# Patient Record
Sex: FEMALE | Race: WHITE | NOT HISPANIC OR LATINO | Employment: OTHER | URBAN - METROPOLITAN AREA
[De-identification: names, ages, dates, MRNs, and addresses within clinical notes are randomized per-mention and may not be internally consistent; named-entity substitution may affect disease eponyms.]

---

## 2024-06-26 ENCOUNTER — TELEPHONE (OUTPATIENT)
Dept: FAMILY MEDICINE | Facility: CLINIC | Age: 79
End: 2024-06-26
Payer: COMMERCIAL

## 2024-06-26 NOTE — TELEPHONE ENCOUNTER
Spoke with Autumn tried to find any opening on tomorrow for pt but there is not any openings except [place] and Autumn said her aunt will not go that far away from home. Autumn is going to call around and see if there is someone else that can see her aunt. If her aunt ends up in the ER Autumn will call me back and I will book her to ECA with Dr. Hu on 7/1/24 at [facility]

## 2024-06-26 NOTE — TELEPHONE ENCOUNTER
----- Message from Ce Whalen sent at 6/26/2024  3:36 PM CDT -----  Contact: Autumn/ Niece  Patient's Niece  is trying to get appointment  for extremely high BP. Please callback [phone]

## 2024-07-01 ENCOUNTER — HOSPITAL ENCOUNTER (OUTPATIENT)
Dept: RADIOLOGY | Facility: HOSPITAL | Age: 79
Discharge: HOME | End: 2024-07-01
Attending: REGISTERED NURSE
Payer: COMMERCIAL

## 2024-07-01 ENCOUNTER — OFFICE VISIT (OUTPATIENT)
Dept: FAMILY MEDICINE | Facility: CLINIC | Age: 79
DRG: 392 | End: 2024-07-01
Payer: MEDICARE

## 2024-07-01 ENCOUNTER — TELEPHONE (OUTPATIENT)
Dept: FAMILY MEDICINE | Facility: CLINIC | Age: 79
End: 2024-07-01
Payer: MEDICARE

## 2024-07-01 VITALS
TEMPERATURE: 97 F | SYSTOLIC BLOOD PRESSURE: 160 MMHG | DIASTOLIC BLOOD PRESSURE: 74 MMHG | WEIGHT: 137.88 LBS | HEART RATE: 110 BPM | HEIGHT: 66 IN | OXYGEN SATURATION: 96 % | BODY MASS INDEX: 22.16 KG/M2

## 2024-07-01 DIAGNOSIS — R19.5 CHANGE IN STOOL: ICD-10-CM

## 2024-07-01 DIAGNOSIS — R14.0 ABDOMINAL BLOATING: ICD-10-CM

## 2024-07-01 DIAGNOSIS — I10 HYPERTENSION, UNSPECIFIED TYPE: ICD-10-CM

## 2024-07-01 DIAGNOSIS — R10.9 ABDOMINAL PAIN, UNSPECIFIED ABDOMINAL LOCATION: ICD-10-CM

## 2024-07-01 DIAGNOSIS — Z90.49 HISTORY OF CHOLECYSTECTOMY: ICD-10-CM

## 2024-07-01 PROCEDURE — 99203 OFFICE O/P NEW LOW 30 MIN: CPT | Mod: S$PBB,,, | Performed by: REGISTERED NURSE

## 2024-07-01 PROCEDURE — 74019 RADEX ABDOMEN 2 VIEWS: CPT | Mod: 26,,, | Performed by: RADIOLOGY

## 2024-07-01 PROCEDURE — 99214 OFFICE O/P EST MOD 30 MIN: CPT | Mod: PBBFAC,25,PO | Performed by: REGISTERED NURSE

## 2024-07-01 PROCEDURE — 99999 PR PBB SHADOW E&M-EST. PATIENT-LVL IV: CPT | Mod: PBBFAC,,, | Performed by: REGISTERED NURSE

## 2024-07-01 PROCEDURE — G2211 COMPLEX E/M VISIT ADD ON: HCPCS | Mod: S$PBB,,, | Performed by: REGISTERED NURSE

## 2024-07-01 PROCEDURE — 74019 RADEX ABDOMEN 2 VIEWS: CPT | Mod: TC,FY,PO

## 2024-07-01 RX ORDER — AMLODIPINE BESYLATE 5 MG/1
5 TABLET ORAL DAILY
Qty: 90 TABLET | Refills: 1 | Status: ON HOLD | OUTPATIENT
Start: 2024-07-01 | End: 2024-07-04

## 2024-07-01 NOTE — TELEPHONE ENCOUNTER
----- Message from Ce Whalen sent at 7/1/2024  3:50 PM CDT -----  Patient is calling running a few minutes late, but lara

## 2024-07-01 NOTE — PROGRESS NOTES
"NEW PATIENT    SUBJECTIVE:     Isabel Miles  MRN:  [id number]  78 y.o. 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  DX with HTN several years ago, admits to neglecting medical care and off med for about 4 years.  Home bp running ~ 190/112, often higher.  Denies cp, sob, edema, fatigue, dizziness, blurred vision or HA.  She was on medication previously but unable to recall what she was taking but reports it "made by back hurt" so not able to tolerate, stopped med.  Reports lots of water, low salt diet.  No smoking or etoh intake.  No caffeine.      REVIEW OF SYSTEMS:  Review of Systems   Constitutional:  Negative for chills, fatigue and fever.   HENT: Negative.     Eyes: Negative.    Respiratory: Negative.     Cardiovascular: Negative.    Gastrointestinal:  Positive for abdominal distention, abdominal pain, diarrhea, nausea and vomiting. Negative for anal bleeding, blood in stool, constipation and rectal pain.        Reports abd pain and bloating x 1 to 2 weeks, grad worsening.  Feels this occurred after eating red beans & rice.  History of cholecystectomy.  Reports stools often loose and appear "like sludge".  Denies mucus or bloody stools.  LBM ~ 3 days ago and again this morning, reports "it was pasty".  Denies gas, fever or chills.  Reports occ NV.   Endocrine: Negative for polydipsia, polyphagia and polyuria.   Genitourinary: Negative.    Skin:  Negative for rash and wound.   Neurological:  Negative for dizziness, facial asymmetry, light-headedness and headaches.   Hematological:  Negative for adenopathy. Does not bruise/bleed easily.   Psychiatric/Behavioral: Negative.           ALLERGIES:  Review of patient's allergies indicates:  No Known Allergies      PROBLEM LIST:  Patient Active Problem List   Diagnosis    Hypertension       CURRENT MEDICATION LIST:    NONE      Past medical, surgical, family " "and social histories have been reviewed today.      OBJECTIVE:     Vitals:    07/01/24 1613   BP: (!) 160/74   Pulse: 110   Temp: 96.9 °F (36.1 °C)   TempSrc: Tympanic   SpO2: 96%   Weight: 62.5 kg (137 lb 14.4 oz)   Height: 5' 6" (1.676 m)       Physical Exam  Vitals reviewed.   Constitutional:       General: She is not in acute distress.  HENT:      Head: Normocephalic and atraumatic.   Eyes:      Pupils: Pupils are equal, round, and reactive to light.   Cardiovascular:      Rate and Rhythm: Normal rate and regular rhythm.      Pulses: Normal pulses.      Heart sounds: Normal heart sounds.   Pulmonary:      Effort: Pulmonary effort is normal.      Breath sounds: Normal breath sounds.   Abdominal:      General: Bowel sounds are increased. There is distension.      Palpations: Abdomen is rigid. There is no mass.      Tenderness: There is generalized abdominal tenderness. There is no right CVA tenderness, left CVA tenderness, guarding or rebound.   Musculoskeletal:         General: Normal range of motion.      Cervical back: Normal range of motion and neck supple. No rigidity.      Right lower leg: No edema.      Left lower leg: No edema.   Skin:     Capillary Refill: Capillary refill takes less than 2 seconds.   Neurological:      Mental Status: She is alert and oriented to person, place, and time.      Motor: No weakness.      Gait: Gait normal.   Psychiatric:         Mood and Affect: Mood normal.         Behavior: Behavior normal.         Thought Content: Thought content normal.         Judgment: Judgment normal.         ASSESSMENT:     1. Hypertension, unspecified type ----- chronic issue, uncontrolled.  Start on CCB, up-titrate based on bp response.  DASH diet, stay active.  -     amLODIPine (NORVASC) 5 MG tablet; Take 1 tablet (5 mg total) by mouth once daily.  Dispense: 90 tablet; Refill: 1    2. Abdominal pain, unspecified abdominal location  -     X-Ray Abdomen Flat And Erect; Future; Expected date: " 07/01/2024    3. Abdominal bloating  -     X-Ray Abdomen Flat And Erect; Future; Expected date: 07/01/2024    4. Change in stool ------- with abd pain & bloating  -     X-Ray Abdomen Flat And Erect; Future; Expected date: 07/01/2024    5. History of cholecystectomy      PLAN:     HTN medication daily as ordered.  XR pending.  Nurse visit for bp recheck in 1 week.  RTC as directed and/or prn.        KEYSHAWN Anaya  [facility]       30 minutes of total time spent on the encounter, which includes face to face time and non-face to face time preparing to see the patient.  This includes obtaining and/or reviewing separately obtained history, performing a medically appropriate examination and/or evaluation, and counseling and educating the patient/family/caregiver.  Includes documenting clinical information in the electronic or other health record, independently interpreting results (not separately reported) and communicating results to the patient/family/caregiver, with care coordination (not separately reported).  Medications, tests and/or procedures ordered as necessary along with referring and communicating with other health professionals (when not separately reported).

## 2024-07-02 ENCOUNTER — TELEPHONE (OUTPATIENT)
Dept: FAMILY MEDICINE | Facility: CLINIC | Age: 79
End: 2024-07-02
Payer: MEDICARE

## 2024-07-02 ENCOUNTER — HOSPITAL ENCOUNTER (INPATIENT)
Facility: HOSPITAL | Age: 79
LOS: 1 days | Discharge: HOME | DRG: 392 | End: 2024-07-04
Attending: EMERGENCY MEDICINE | Admitting: FAMILY MEDICINE
Payer: MEDICARE

## 2024-07-02 DIAGNOSIS — R07.9 CHEST PAIN: ICD-10-CM

## 2024-07-02 DIAGNOSIS — E87.6 HYPOKALEMIA: ICD-10-CM

## 2024-07-02 DIAGNOSIS — I10 HYPERTENSION, UNSPECIFIED TYPE: ICD-10-CM

## 2024-07-02 DIAGNOSIS — K52.9 COLITIS: Primary | ICD-10-CM

## 2024-07-02 PROBLEM — I16.0 HYPERTENSIVE URGENCY: Status: ACTIVE | Noted: 2024-07-02

## 2024-07-02 PROBLEM — R82.90 ABNORMAL URINALYSIS: Status: ACTIVE | Noted: 2024-07-02

## 2024-07-02 PROBLEM — R19.7 DIARRHEA: Status: ACTIVE | Noted: 2024-07-02

## 2024-07-02 LAB
ALBUMIN SERPL BCP-MCNC: 3.5 G/DL (ref 3.5–5.2)
ALP SERPL-CCNC: 125 U/L (ref 55–135)
ALT SERPL W/O P-5'-P-CCNC: 14 U/L (ref 10–44)
AMORPH CRY URNS QL MICRO: ABNORMAL
ANION GAP SERPL CALC-SCNC: 12 MMOL/L (ref 8–16)
ANION GAP SERPL CALC-SCNC: 13 MMOL/L (ref 8–16)
AST SERPL-CCNC: 22 U/L (ref 10–40)
BACTERIA #/AREA URNS HPF: ABNORMAL /HPF
BASOPHILS # BLD AUTO: 0.05 K/UL (ref 0–0.2)
BASOPHILS NFR BLD: 0.8 % (ref 0–1.9)
BILIRUB SERPL-MCNC: 0.6 MG/DL (ref 0.1–1)
BILIRUB UR QL STRIP: NEGATIVE
BUN SERPL-MCNC: 12 MG/DL (ref 8–23)
BUN SERPL-MCNC: 14 MG/DL (ref 8–23)
CALCIUM SERPL-MCNC: 8.9 MG/DL (ref 8.7–10.5)
CALCIUM SERPL-MCNC: 9.4 MG/DL (ref 8.7–10.5)
CHLORIDE SERPL-SCNC: 100 MMOL/L (ref 95–110)
CHLORIDE SERPL-SCNC: 98 MMOL/L (ref 95–110)
CLARITY UR: ABNORMAL
CO2 SERPL-SCNC: 23 MMOL/L (ref 23–29)
CO2 SERPL-SCNC: 26 MMOL/L (ref 23–29)
COLOR UR: YELLOW
CREAT SERPL-MCNC: 1 MG/DL (ref 0.5–1.4)
CREAT SERPL-MCNC: 1.2 MG/DL (ref 0.5–1.4)
DIFFERENTIAL METHOD BLD: ABNORMAL
EOSINOPHIL # BLD AUTO: 0.1 K/UL (ref 0–0.5)
EOSINOPHIL NFR BLD: 1.5 % (ref 0–8)
ERYTHROCYTE [DISTWIDTH] IN BLOOD BY AUTOMATED COUNT: 13.1 % (ref 11.5–14.5)
ERYTHROCYTE [SEDIMENTATION RATE] IN BLOOD BY WESTERGREN METHOD: 55 MM/HR (ref 0–20)
EST. GFR  (NO RACE VARIABLE): 46 ML/MIN/1.73 M^2
EST. GFR  (NO RACE VARIABLE): 57 ML/MIN/1.73 M^2
GLUCOSE SERPL-MCNC: 100 MG/DL (ref 70–110)
GLUCOSE SERPL-MCNC: 108 MG/DL (ref 70–110)
GLUCOSE UR QL STRIP: NEGATIVE
HCT VFR BLD AUTO: 36.5 % (ref 37–48.5)
HGB BLD-MCNC: 12.2 G/DL (ref 12–16)
HGB UR QL STRIP: ABNORMAL
IMM GRANULOCYTES # BLD AUTO: 0.01 K/UL (ref 0–0.04)
IMM GRANULOCYTES NFR BLD AUTO: 0.2 % (ref 0–0.5)
INFLUENZA A, MOLECULAR: NEGATIVE
INFLUENZA B, MOLECULAR: NEGATIVE
KETONES UR QL STRIP: ABNORMAL
LACTATE SERPL-SCNC: 1 MMOL/L (ref 0.5–2.2)
LEUKOCYTE ESTERASE UR QL STRIP: ABNORMAL
LIPASE SERPL-CCNC: 16 U/L (ref 4–60)
LYMPHOCYTES # BLD AUTO: 1.9 K/UL (ref 1–4.8)
LYMPHOCYTES NFR BLD: 29.2 % (ref 18–48)
MAGNESIUM SERPL-MCNC: 1.9 MG/DL (ref 1.6–2.6)
MCH RBC QN AUTO: 30.5 PG (ref 27–31)
MCHC RBC AUTO-ENTMCNC: 33.4 G/DL (ref 32–36)
MCV RBC AUTO: 91 FL (ref 82–98)
MICROSCOPIC COMMENT: ABNORMAL
MONOCYTES # BLD AUTO: 0.8 K/UL (ref 0.3–1)
MONOCYTES NFR BLD: 12.3 % (ref 4–15)
NEUTROPHILS # BLD AUTO: 3.7 K/UL (ref 1.8–7.7)
NEUTROPHILS NFR BLD: 56 % (ref 38–73)
NITRITE UR QL STRIP: NEGATIVE
NRBC BLD-RTO: 0 /100 WBC
PH UR STRIP: 7 [PH] (ref 5–8)
PLATELET # BLD AUTO: 390 K/UL (ref 150–450)
PMV BLD AUTO: 9.8 FL (ref 9.2–12.9)
POTASSIUM SERPL-SCNC: 2.4 MMOL/L (ref 3.5–5.1)
POTASSIUM SERPL-SCNC: 2.8 MMOL/L (ref 3.5–5.1)
POTASSIUM SERPL-SCNC: 2.9 MMOL/L (ref 3.5–5.1)
PROT SERPL-MCNC: 8.3 G/DL (ref 6–8.4)
PROT UR QL STRIP: NEGATIVE
RBC # BLD AUTO: 4 M/UL (ref 4–5.4)
RBC #/AREA URNS HPF: 3 /HPF (ref 0–4)
SARS-COV-2 RDRP RESP QL NAA+PROBE: NEGATIVE
SODIUM SERPL-SCNC: 136 MMOL/L (ref 136–145)
SODIUM SERPL-SCNC: 136 MMOL/L (ref 136–145)
SP GR UR STRIP: 1.02 (ref 1–1.03)
SPECIMEN SOURCE: NORMAL
TSH SERPL DL<=0.005 MIU/L-ACNC: 3.44 UIU/ML (ref 0.4–4)
URN SPEC COLLECT METH UR: ABNORMAL
UROBILINOGEN UR STRIP-ACNC: NEGATIVE EU/DL
WBC # BLD AUTO: 6.65 K/UL (ref 3.9–12.7)
WBC #/AREA URNS HPF: 4 /HPF (ref 0–5)

## 2024-07-02 PROCEDURE — 25000003 PHARM REV CODE 250: Performed by: REGISTERED NURSE

## 2024-07-02 PROCEDURE — U0002 COVID-19 LAB TEST NON-CDC: HCPCS | Performed by: INTERNAL MEDICINE

## 2024-07-02 PROCEDURE — 80053 COMPREHEN METABOLIC PANEL: CPT | Performed by: REGISTERED NURSE

## 2024-07-02 PROCEDURE — 85651 RBC SED RATE NONAUTOMATED: CPT | Performed by: INTERNAL MEDICINE

## 2024-07-02 PROCEDURE — 83735 ASSAY OF MAGNESIUM: CPT | Performed by: REGISTERED NURSE

## 2024-07-02 PROCEDURE — 63600175 PHARM REV CODE 636 W HCPCS: Mod: JZ,JG | Performed by: INTERNAL MEDICINE

## 2024-07-02 PROCEDURE — G0378 HOSPITAL OBSERVATION PER HR: HCPCS

## 2024-07-02 PROCEDURE — 96366 THER/PROPH/DIAG IV INF ADDON: CPT

## 2024-07-02 PROCEDURE — 25000003 PHARM REV CODE 250: Performed by: INTERNAL MEDICINE

## 2024-07-02 PROCEDURE — 84132 ASSAY OF SERUM POTASSIUM: CPT | Performed by: INTERNAL MEDICINE

## 2024-07-02 PROCEDURE — 96361 HYDRATE IV INFUSION ADD-ON: CPT

## 2024-07-02 PROCEDURE — 83690 ASSAY OF LIPASE: CPT | Performed by: REGISTERED NURSE

## 2024-07-02 PROCEDURE — 84443 ASSAY THYROID STIM HORMONE: CPT | Performed by: INTERNAL MEDICINE

## 2024-07-02 PROCEDURE — 25000003 PHARM REV CODE 250: Performed by: EMERGENCY MEDICINE

## 2024-07-02 PROCEDURE — 84436 ASSAY OF TOTAL THYROXINE: CPT | Performed by: INTERNAL MEDICINE

## 2024-07-02 PROCEDURE — 63600175 PHARM REV CODE 636 W HCPCS: Performed by: INTERNAL MEDICINE

## 2024-07-02 PROCEDURE — 80048 BASIC METABOLIC PNL TOTAL CA: CPT | Mod: XB | Performed by: EMERGENCY MEDICINE

## 2024-07-02 PROCEDURE — 85025 COMPLETE CBC W/AUTO DIFF WBC: CPT | Performed by: REGISTERED NURSE

## 2024-07-02 PROCEDURE — 96365 THER/PROPH/DIAG IV INF INIT: CPT

## 2024-07-02 PROCEDURE — 25500020 PHARM REV CODE 255: Performed by: EMERGENCY MEDICINE

## 2024-07-02 PROCEDURE — 36415 COLL VENOUS BLD VENIPUNCTURE: CPT | Performed by: INTERNAL MEDICINE

## 2024-07-02 PROCEDURE — 99285 EMERGENCY DEPT VISIT HI MDM: CPT | Mod: 25

## 2024-07-02 PROCEDURE — 96372 THER/PROPH/DIAG INJ SC/IM: CPT | Performed by: INTERNAL MEDICINE

## 2024-07-02 PROCEDURE — 83605 ASSAY OF LACTIC ACID: CPT | Performed by: INTERNAL MEDICINE

## 2024-07-02 PROCEDURE — 87502 INFLUENZA DNA AMP PROBE: CPT | Performed by: INTERNAL MEDICINE

## 2024-07-02 PROCEDURE — 63600175 PHARM REV CODE 636 W HCPCS: Performed by: EMERGENCY MEDICINE

## 2024-07-02 PROCEDURE — 81000 URINALYSIS NONAUTO W/SCOPE: CPT | Performed by: REGISTERED NURSE

## 2024-07-02 RX ORDER — IBUPROFEN 200 MG
24 TABLET ORAL
Status: DISCONTINUED | OUTPATIENT
Start: 2024-07-02 | End: 2024-07-04 | Stop reason: HOSPADM

## 2024-07-02 RX ORDER — POTASSIUM CHLORIDE 20 MEQ/1
60 TABLET, EXTENDED RELEASE ORAL ONCE
Status: COMPLETED | OUTPATIENT
Start: 2024-07-02 | End: 2024-07-02

## 2024-07-02 RX ORDER — NALOXONE HCL 0.4 MG/ML
0.02 VIAL (ML) INJECTION
Status: DISCONTINUED | OUTPATIENT
Start: 2024-07-02 | End: 2024-07-04 | Stop reason: HOSPADM

## 2024-07-02 RX ORDER — POTASSIUM CHLORIDE 7.45 MG/ML
10 INJECTION INTRAVENOUS
Status: COMPLETED | OUTPATIENT
Start: 2024-07-02 | End: 2024-07-02

## 2024-07-02 RX ORDER — ACETAMINOPHEN 325 MG/1
650 TABLET ORAL EVERY 6 HOURS PRN
Status: DISCONTINUED | OUTPATIENT
Start: 2024-07-02 | End: 2024-07-04 | Stop reason: HOSPADM

## 2024-07-02 RX ORDER — HYDRALAZINE HYDROCHLORIDE 20 MG/ML
10 INJECTION INTRAMUSCULAR; INTRAVENOUS ONCE
Status: COMPLETED | OUTPATIENT
Start: 2024-07-02 | End: 2024-07-02

## 2024-07-02 RX ORDER — GLUCAGON 1 MG
1 KIT INJECTION
Status: DISCONTINUED | OUTPATIENT
Start: 2024-07-02 | End: 2024-07-04 | Stop reason: HOSPADM

## 2024-07-02 RX ORDER — ENOXAPARIN SODIUM 100 MG/ML
40 INJECTION SUBCUTANEOUS EVERY 24 HOURS
Status: DISCONTINUED | OUTPATIENT
Start: 2024-07-02 | End: 2024-07-04 | Stop reason: HOSPADM

## 2024-07-02 RX ORDER — METRONIDAZOLE 500 MG/100ML
500 INJECTION, SOLUTION INTRAVENOUS
Status: DISCONTINUED | OUTPATIENT
Start: 2024-07-02 | End: 2024-07-04 | Stop reason: HOSPADM

## 2024-07-02 RX ORDER — MORPHINE SULFATE 4 MG/ML
2 INJECTION, SOLUTION INTRAMUSCULAR; INTRAVENOUS EVERY 6 HOURS PRN
Status: DISCONTINUED | OUTPATIENT
Start: 2024-07-02 | End: 2024-07-04 | Stop reason: HOSPADM

## 2024-07-02 RX ORDER — IBUPROFEN 200 MG
16 TABLET ORAL
Status: DISCONTINUED | OUTPATIENT
Start: 2024-07-02 | End: 2024-07-04 | Stop reason: HOSPADM

## 2024-07-02 RX ORDER — SODIUM CHLORIDE 0.9 % (FLUSH) 0.9 %
10 SYRINGE (ML) INJECTION EVERY 12 HOURS PRN
Status: DISCONTINUED | OUTPATIENT
Start: 2024-07-02 | End: 2024-07-04 | Stop reason: HOSPADM

## 2024-07-02 RX ORDER — HYDRALAZINE HYDROCHLORIDE 20 MG/ML
10 INJECTION INTRAMUSCULAR; INTRAVENOUS EVERY 6 HOURS PRN
Status: DISCONTINUED | OUTPATIENT
Start: 2024-07-02 | End: 2024-07-04 | Stop reason: HOSPADM

## 2024-07-02 RX ORDER — MORPHINE SULFATE 4 MG/ML
4 INJECTION, SOLUTION INTRAMUSCULAR; INTRAVENOUS
Status: ACTIVE | OUTPATIENT
Start: 2024-07-02 | End: 2024-07-03

## 2024-07-02 RX ORDER — ONDANSETRON HYDROCHLORIDE 2 MG/ML
4 INJECTION, SOLUTION INTRAVENOUS
Status: ACTIVE | OUTPATIENT
Start: 2024-07-02 | End: 2024-07-03

## 2024-07-02 RX ORDER — ONDANSETRON HYDROCHLORIDE 2 MG/ML
4 INJECTION, SOLUTION INTRAVENOUS EVERY 6 HOURS PRN
Status: DISCONTINUED | OUTPATIENT
Start: 2024-07-02 | End: 2024-07-04 | Stop reason: HOSPADM

## 2024-07-02 RX ORDER — AMLODIPINE BESYLATE 5 MG/1
5 TABLET ORAL DAILY
Status: DISCONTINUED | OUTPATIENT
Start: 2024-07-02 | End: 2024-07-04 | Stop reason: HOSPADM

## 2024-07-02 RX ORDER — SODIUM CHLORIDE, SODIUM LACTATE, POTASSIUM CHLORIDE, CALCIUM CHLORIDE 600; 310; 30; 20 MG/100ML; MG/100ML; MG/100ML; MG/100ML
INJECTION, SOLUTION INTRAVENOUS CONTINUOUS
Status: DISCONTINUED | OUTPATIENT
Start: 2024-07-02 | End: 2024-07-04 | Stop reason: HOSPADM

## 2024-07-02 RX ORDER — CIPROFLOXACIN 2 MG/ML
400 INJECTION, SOLUTION INTRAVENOUS
Status: DISCONTINUED | OUTPATIENT
Start: 2024-07-02 | End: 2024-07-04 | Stop reason: HOSPADM

## 2024-07-02 RX ADMIN — ENOXAPARIN SODIUM 40 MG: 40 INJECTION SUBCUTANEOUS at 08:07

## 2024-07-02 RX ADMIN — POTASSIUM CHLORIDE 60 MEQ: 1500 TABLET, EXTENDED RELEASE ORAL at 03:07

## 2024-07-02 RX ADMIN — CIPROFLOXACIN 400 MG: 2 INJECTION, SOLUTION INTRAVENOUS at 07:07

## 2024-07-02 RX ADMIN — SODIUM CHLORIDE 1000 ML: 9 INJECTION, SOLUTION INTRAVENOUS at 01:07

## 2024-07-02 RX ADMIN — HYDRALAZINE HYDROCHLORIDE 10 MG: 20 INJECTION, SOLUTION INTRAMUSCULAR; INTRAVENOUS at 08:07

## 2024-07-02 RX ADMIN — SODIUM CHLORIDE, SODIUM LACTATE, POTASSIUM CHLORIDE, AND CALCIUM CHLORIDE: 600; 310; 30; 20 INJECTION, SOLUTION INTRAVENOUS at 08:07

## 2024-07-02 RX ADMIN — IOHEXOL 100 ML: 350 INJECTION, SOLUTION INTRAVENOUS at 02:07

## 2024-07-02 RX ADMIN — METRONIDAZOLE 500 MG: 500 INJECTION, SOLUTION INTRAVENOUS at 08:07

## 2024-07-02 RX ADMIN — POTASSIUM CHLORIDE 10 MEQ: 7.46 INJECTION, SOLUTION INTRAVENOUS at 08:07

## 2024-07-02 RX ADMIN — POTASSIUM CHLORIDE 10 MEQ: 7.46 INJECTION, SOLUTION INTRAVENOUS at 02:07

## 2024-07-02 RX ADMIN — AMLODIPINE BESYLATE 5 MG: 5 TABLET ORAL at 08:07

## 2024-07-02 RX ADMIN — POTASSIUM CHLORIDE 10 MEQ: 7.46 INJECTION, SOLUTION INTRAVENOUS at 09:07

## 2024-07-02 NOTE — TELEPHONE ENCOUNTER
7/2/24 at 11:00 am  Phoned pt and  on file although same number.  Following-up on abnormal XR done yesterday and my rec for urgent ER evaluation.  Wanting to make sure pt went to ER and needing update.  I do see where my rec was reviewed and read in her My-Chart portal.  No answer, left v/m msg for return call.

## 2024-07-02 NOTE — FIRST PROVIDER EVALUATION
Medical screening examination initiated.  I have conducted a focused provider triage encounter, findings are as follows:    Brief history of present illness:  Sent to the emergency department to rule out bowel obstruction, seen yesterday and had abdominal x-ray.  Called last night and told to report to the emergency department.    Vitals:    07/02/24 1231   BP: (!) 209/93   BP Location: Right arm   Patient Position: Sitting   Pulse: 104   Resp: 17   Temp: 98.2 °F (36.8 °C)   TempSrc: Oral   SpO2: 95%   Weight: 61.7 kg (136 lb 0.4 oz)       Pertinent physical exam:  Hypotensive, patient complaining of pain, no acute distress    Brief workup plan:  Workup    Preliminary workup initiated; this workup will be continued and followed by the physician or advanced practice provider that is assigned to the patient when roomed.

## 2024-07-02 NOTE — ED PROVIDER NOTES
SCRIBE #1 NOTE: I, Radu Davis, am scribing for, and in the presence of, Tony Cifuentes DO. I have scribed the entire note.       History     Chief Complaint   Patient presents with    Abdominal Pain     Pt reports lower abdominal pain x 2/3 weeks. Per niece, pt had a CT scan done yesterday and has a bowel obstruction.      Review of patient's allergies indicates:  No Known Allergies      History of Present Illness     HPI    7/2/2024, 1:11 PM  History obtained from the patient and niece      History of Present Illness: Isabel Miles is a 78 y.o. female patient who presents to the Emergency Department for evaluation of lower abd pain which onset gradually the past two weeks. Pt's niece reports pt had a CT scan done yesterday which shows pt had a bowel obstruction. Symptoms are constant and moderate in severity. No mitigating or exacerbating factors reported. Associated sxs include diarrhea and inability to pass gas. Patient denies any vomiting, nausea, and all other sxs at this time. No prior Tx. Pt has her gallbladder previously removed. No further complaints or concerns at this time.       Arrival mode: Personal vehicle      PCP: No, Primary Doctor        Past Medical History:  Past Medical History:   Diagnosis Date    Hypertension        Past Surgical History:  Past Surgical History:   Procedure Laterality Date    CHOLECYSTECTOMY           Family History:  No family history on file.    Social History:  Social History     Tobacco Use    Smoking status: Never    Smokeless tobacco: Never   Substance and Sexual Activity    Alcohol use: Never    Drug use: Never    Sexual activity: Not on file        Review of Systems     Review of Systems   Gastrointestinal:  Positive for abdominal pain (lower) and diarrhea. Negative for nausea and vomiting.        (+) inability to pass gas        Physical Exam     Initial Vitals [07/02/24 1231]   BP Pulse Resp Temp SpO2   (!) 209/93 104 17 98.2 °F (36.8 °C) 95 %      MAP        --          Physical Exam  Vitals reviewed.   Cardiovascular:      Rate and Rhythm: Normal rate and regular rhythm.      Heart sounds: No murmur heard.  Pulmonary:      Effort: Pulmonary effort is normal.      Breath sounds: No wheezing.   Abdominal:      Palpations: Abdomen is soft.      Comments: No tenderness to palpation, distention noted to the bilateral lower quadrants no rigidity   Musculoskeletal:         General: Normal range of motion.   Skin:     General: Skin is warm and dry.      Findings: No rash.   Neurological:      General: No focal deficit present.      Mental Status: She is alert and oriented to person, place, and time.            ED Course   Critical Care    Date/Time: 7/2/2024 7:00 PM    Performed by: Tony Cifuentes DO  Authorized by: Tony Cifuentes DO  Direct patient critical care time: 20 minutes  Ordering / reviewing critical care time: 5 minutes  Documentation critical care time: 5 minutes  Consulting other physicians critical care time: 5 minutes  Total critical care time (exclusive of procedural time) : 35 minutes  Critical care time was exclusive of separately billable procedures and treating other patients.  Critical care was necessary to treat or prevent imminent or life-threatening deterioration of the following conditions: metabolic crisis.  Critical care was time spent personally by me on the following activities: development of treatment plan with patient or surrogate, discussions with consultants, evaluation of patient's response to treatment, ordering and performing treatments and interventions, ordering and review of laboratory studies, ordering and review of radiographic studies, re-evaluation of patient's condition and review of old charts.        ED Vital Signs:  Vitals:    07/03/24 1203 07/03/24 1554 07/03/24 1845 07/03/24 1951   BP: (!) 156/65 136/63  (!) 161/72   Pulse: 87 85  91   Resp: 18 18 15 20   Temp: 98 °F (36.7 °C) 97.5 °F (36.4 °C)  98.4 °F  (36.9 °C)   TempSrc: Oral   Oral   SpO2: 97% 99%  98%   Weight:       Height:        07/03/24 2000 07/03/24 2018 07/03/24 2033 07/04/24 0031   BP:  (!) 171/74 (!) 152/66 (!) 146/65   Pulse: 88 93 91 82   Resp:    20   Temp:    98.4 °F (36.9 °C)   TempSrc:    Oral   SpO2:  97%  97%   Weight:       Height:        07/04/24 0400 07/04/24 0443 07/04/24 0748 07/04/24 0758   BP:  (!) 155/85 (!) 144/67    Pulse: 84 88 79 99   Resp:  20 18    Temp:  98 °F (36.7 °C) 98 °F (36.7 °C)    TempSrc:  Oral     SpO2:  98% 98%    Weight:       Height:        07/04/24 1100 07/04/24 1246 07/04/24 1300   BP:  (!) 165/72    Pulse: 93 93 97   Resp:  18    Temp:  97.8 °F (36.6 °C)    TempSrc:  Oral    SpO2:  99%    Weight:      Height:          Abnormal Lab Results:  Labs Reviewed   CBC W/ AUTO DIFFERENTIAL - Abnormal; Notable for the following components:       Result Value    Hematocrit 36.5 (*)     All other components within normal limits   COMPREHENSIVE METABOLIC PANEL - Abnormal; Notable for the following components:    Potassium 2.4 (*)     eGFR 46 (*)     All other components within normal limits    Narrative:     POTASSIUM  critical result(s) called and verbal readback obtained   from jimmy beal rn by [facility] 07/02/2024 13:52   URINALYSIS, REFLEX TO URINE CULTURE - Abnormal; Notable for the following components:    Appearance, UA Hazy (*)     Ketones, UA Trace (*)     Occult Blood UA 1+ (*)     Leukocytes, UA 2+ (*)     All other components within normal limits    Narrative:     Specimen Source->Urine   URINALYSIS MICROSCOPIC - Abnormal; Notable for the following components:    Bacteria Many (*)     All other components within normal limits    Narrative:     Specimen Source->Urine   BASIC METABOLIC PANEL - Abnormal; Notable for the following components:    Potassium 2.9 (*)     eGFR 57 (*)     All other components within normal limits   LIPASE   MAGNESIUM   MAGNESIUM        All Lab Results:  Results for orders placed or performed  during the hospital encounter of 07/02/24   Influenza A & B by Molecular    Specimen: Nasopharyngeal Swab   Result Value Ref Range    Influenza A, Molecular Negative Negative    Influenza B, Molecular Negative Negative    Flu A & B Source Nasal swab    Clostridium difficile EIA    Specimen: Stool   Result Value Ref Range    C. diff Antigen Negative Negative    C difficile Toxins A+B, EIA Negative Negative   Stool culture    Specimen: Stool   Result Value Ref Range    Stool Culture Culture in progress    CBC Auto Differential   Result Value Ref Range    WBC 6.65 3.90 - 12.70 K/uL    RBC 4.00 4.00 - 5.40 M/uL    Hemoglobin 12.2 12.0 - 16.0 g/dL    Hematocrit 36.5 (L) 37.0 - 48.5 %    MCV 91 82 - 98 fL    MCH 30.5 27.0 - 31.0 pg    MCHC 33.4 32.0 - 36.0 g/dL    RDW 13.1 11.5 - 14.5 %    Platelets 390 150 - 450 K/uL    MPV 9.8 9.2 - 12.9 fL    Immature Granulocytes 0.2 0.0 - 0.5 %    Gran # (ANC) 3.7 1.8 - 7.7 K/uL    Immature Grans (Abs) 0.01 0.00 - 0.04 K/uL    Lymph # 1.9 1.0 - 4.8 K/uL    Mono # 0.8 0.3 - 1.0 K/uL    Eos # 0.1 0.0 - 0.5 K/uL    Baso # 0.05 0.00 - 0.20 K/uL    nRBC 0 0 /100 WBC    Gran % 56.0 38.0 - 73.0 %    Lymph % 29.2 18.0 - 48.0 %    Mono % 12.3 4.0 - 15.0 %    Eosinophil % 1.5 0.0 - 8.0 %    Basophil % 0.8 0.0 - 1.9 %    Differential Method Automated    Comprehensive Metabolic Panel   Result Value Ref Range    Sodium 136 136 - 145 mmol/L    Potassium 2.4 (LL) 3.5 - 5.1 mmol/L    Chloride 98 95 - 110 mmol/L    CO2 26 23 - 29 mmol/L    Glucose 108 70 - 110 mg/dL    BUN 14 8 - 23 mg/dL    Creatinine 1.2 0.5 - 1.4 mg/dL    Calcium 9.4 8.7 - 10.5 mg/dL    Total Protein 8.3 6.0 - 8.4 g/dL    Albumin 3.5 3.5 - 5.2 g/dL    Total Bilirubin 0.6 0.1 - 1.0 mg/dL    Alkaline Phosphatase 125 55 - 135 U/L    AST 22 10 - 40 U/L    ALT 14 10 - 44 U/L    eGFR 46 (A) >60 mL/min/1.73 m^2    Anion Gap 12 8 - 16 mmol/L   Lipase   Result Value Ref Range    Lipase 16 4 - 60 U/L   Urinalysis, Reflex to Urine Culture  Urine, Clean Catch    Specimen: Urine   Result Value Ref Range    Specimen UA Urine, Clean Catch     Color, UA Yellow Yellow, Straw, Rhodna    Appearance, UA Hazy (A) Clear    pH, UA 7.0 5.0 - 8.0    Specific Gravity, UA 1.020 1.005 - 1.030    Protein, UA Negative Negative    Glucose, UA Negative Negative    Ketones, UA Trace (A) Negative    Bilirubin (UA) Negative Negative    Occult Blood UA 1+ (A) Negative    Nitrite, UA Negative Negative    Urobilinogen, UA Negative <2.0 EU/dL    Leukocytes, UA 2+ (A) Negative   Magnesium   Result Value Ref Range    Magnesium 1.9 1.6 - 2.6 mg/dL   Urinalysis Microscopic   Result Value Ref Range    RBC, UA 3 0 - 4 /hpf    WBC, UA 4 0 - 5 /hpf    Bacteria Many (A) None-Occ /hpf    Amorphous, UA Occasional None-Moderate    Microscopic Comment SEE COMMENT    Basic metabolic panel   Result Value Ref Range    Sodium 136 136 - 145 mmol/L    Potassium 2.9 (L) 3.5 - 5.1 mmol/L    Chloride 100 95 - 110 mmol/L    CO2 23 23 - 29 mmol/L    Glucose 100 70 - 110 mg/dL    BUN 12 8 - 23 mg/dL    Creatinine 1.0 0.5 - 1.4 mg/dL    Calcium 8.9 8.7 - 10.5 mg/dL    Anion Gap 13 8 - 16 mmol/L    eGFR 57 (A) >60 mL/min/1.73 m^2   Lactic acid, plasma   Result Value Ref Range    Lactate (Lactic Acid) 1.0 0.5 - 2.2 mmol/L   COVID-19 Rapid Screening   Result Value Ref Range    SARS-CoV-2 RNA, Amplification, Qual Negative Negative   Sedimentation rate   Result Value Ref Range    Sed Rate 55 (H) 0 - 20 mm/Hr   Potassium   Result Value Ref Range    Potassium 2.8 (L) 3.5 - 5.1 mmol/L   TSH   Result Value Ref Range    TSH 3.440 0.400 - 4.000 uIU/mL   T4   Result Value Ref Range    T4, Total 8.6 4.5 - 11.5 ug/dL   CBC Auto Differential   Result Value Ref Range    WBC 6.79 3.90 - 12.70 K/uL    RBC 3.93 (L) 4.00 - 5.40 M/uL    Hemoglobin 11.7 (L) 12.0 - 16.0 g/dL    Hematocrit 36.6 (L) 37.0 - 48.5 %    MCV 93 82 - 98 fL    MCH 29.8 27.0 - 31.0 pg    MCHC 32.0 32.0 - 36.0 g/dL    RDW 13.1 11.5 - 14.5 %    Platelets  398 150 - 450 K/uL    MPV 10.1 9.2 - 12.9 fL    Immature Granulocytes 0.4 0.0 - 0.5 %    Gran # (ANC) 4.6 1.8 - 7.7 K/uL    Immature Grans (Abs) 0.03 0.00 - 0.04 K/uL    Lymph # 1.3 1.0 - 4.8 K/uL    Mono # 0.7 0.3 - 1.0 K/uL    Eos # 0.1 0.0 - 0.5 K/uL    Baso # 0.04 0.00 - 0.20 K/uL    nRBC 0 0 /100 WBC    Gran % 67.7 38.0 - 73.0 %    Lymph % 19.6 18.0 - 48.0 %    Mono % 10.5 4.0 - 15.0 %    Eosinophil % 1.2 0.0 - 8.0 %    Basophil % 0.6 0.0 - 1.9 %    Differential Method Automated    Comprehensive metabolic panel   Result Value Ref Range    Sodium 137 136 - 145 mmol/L    Potassium 2.8 (L) 3.5 - 5.1 mmol/L    Chloride 102 95 - 110 mmol/L    CO2 21 (L) 23 - 29 mmol/L    Glucose 102 70 - 110 mg/dL    BUN 12 8 - 23 mg/dL    Creatinine 1.1 0.5 - 1.4 mg/dL    Calcium 9.1 8.7 - 10.5 mg/dL    Total Protein 7.7 6.0 - 8.4 g/dL    Albumin 3.2 (L) 3.5 - 5.2 g/dL    Total Bilirubin 0.6 0.1 - 1.0 mg/dL    Alkaline Phosphatase 119 55 - 135 U/L    AST 21 10 - 40 U/L    ALT 15 10 - 44 U/L    eGFR 51 (A) >60 mL/min/1.73 m^2    Anion Gap 14 8 - 16 mmol/L   Magnesium   Result Value Ref Range    Magnesium 1.8 1.6 - 2.6 mg/dL   WBC, Stool   Result Value Ref Range    Stool WBC No neutrophils seen No neutrophils seen   Occult blood x 1, stool    Specimen: Stool   Result Value Ref Range    Occult Blood Negative Negative   Basic Metabolic Panel   Result Value Ref Range    Sodium 135 (L) 136 - 145 mmol/L    Potassium 3.0 (L) 3.5 - 5.1 mmol/L    Chloride 104 95 - 110 mmol/L    CO2 24 23 - 29 mmol/L    Glucose 101 70 - 110 mg/dL    BUN 14 8 - 23 mg/dL    Creatinine 1.2 0.5 - 1.4 mg/dL    Calcium 9.3 8.7 - 10.5 mg/dL    Anion Gap 7 (L) 8 - 16 mmol/L    eGFR 46 (A) >60 mL/min/1.73 m^2        Imaging Results:  Imaging Results              CT Abdomen Pelvis With IV Contrast NO Oral Contrast (Final result)  Result time 07/02/24 14:56:58      Final result by Armand Del Cid MD (07/02/24 14:56:58)                   Impression:      Fluid-filled  colon with wall thickening concerning for colitis.    At the descending/sigmoid colon junction there is questionable shouldering of the fluid within the colon.  Consider correlation with colonoscopy once the patient's acute symptoms have resolved to better exclude underlying mass.    Complete findings as above.    All CT scans at this facility are performed  using dose modulation techniques as appropriate to performed exam including the following:  automated exposure control; adjustment of mA and/or kV according to the patients size (this includes techniques or standardized protocols for targeted exams where dose is matched to indication/reason for exam: i.e. extremities or head);  iterative reconstruction technique.      Electronically signed by: Armand Del Cid  Date:    07/02/2024  Time:    14:56               Narrative:    EXAMINATION:  CT ABDOMEN PELVIS WITH IV CONTRAST    CLINICAL HISTORY:  Abdominal pain, acute, nonlocalized;    TECHNIQUE:  Low dose axial images, sagittal and coronal reformations were obtained from the lung bases to the pubic symphysis.  Contrast was administered.    COMPARISON:  None    FINDINGS:  Heart: Normal in size. No pericardial effusion.    Lung Bases: Well aerated, without consolidation or pleural fluid.    Liver: Normal in size and attenuation, with no focal hepatic lesions.    Gallbladder: Gallbladder surgically absent.    Bile Ducts: No evidence of dilated ducts.    Pancreas: No mass or peripancreatic fat stranding.    Spleen: Unremarkable.    Adrenals: Unremarkable.    Kidneys/ Ureters: Left renal lower pole atrophy.  Bilateral simple renal cysts.  No hydronephrosis.    Bladder: No evidence of wall thickening.    Reproductive organs: Unremarkable.    GI Tract/Mesentery: Moderate hiatal hernia.  There is fluid-filled appearance of the colon with some wall thickening throughout.  This raises concern for early colitis.  At the level of the sigmoid colon there is some questionably  focal wall thickening which appears circumferential possibly related to incomplete distension and wall thickening as seen elsewhere.  Consider correlation with colonoscopy once the patient's acute symptoms have resolved.    Diverticulosis without diverticulitis.  Small bowel appears unremarkable.    Peritoneal Space: No ascites. No free air.    Retroperitoneum: No significant adenopathy.    Abdominal wall: Unremarkable.    Vasculature: Aortic ectasia without aneurysm.  Moderate atherosclerosis.    Bones: No acute fracture.  Osseous degenerative changes.                                                  The Emergency Provider reviewed the vital signs and test results, which are outlined above.     ED Discussion     6:58 PM: Discussed case with Rosa Hathaway MD ([facility]). Dr. Hathaway agrees with current care and management of pt and accepts admission.   Admitting Service: [facility]  Admitting Physician: Dr. Hathaway  Admit to: Obs/Tele     6:58 PM: Re-evaluated pt. I have discussed test results, shared treatment plan, and the need for admission with patient and family at bedside. Pt and family express understanding at this time and agree with all information. All questions answered. Pt and family have no further questions or concerns at this time. Pt is ready for admit.       ED Course as of 07/05/24 0651   Tue Jul 02, 2024   1350 CBC Auto Differential(!)  Nonspecific findings [CD]   1426 Magnesium  wnl [CD]   1839 Basic metabolic panel(!)  Hypokalemia [CD]   1859 Urinalysis, Reflex to Urine Culture Urine, Clean Catch(!)  Bacteriuria noted [CD]      ED Course User Index  [CD] Tony Cifuentes, DO     Medical Decision Making  Patient shows significant hypokalemia along with the colitis, and attempt to provide both IV and oral potassium was given with repeat still showing significant hypokalemia.  Because of this Hospital Medicine has accepted patient for admission.    Amount and/or Complexity of Data  Reviewed  Independent Historian:      Details: niece  Labs: ordered. Decision-making details documented in ED Course.     Details: CMP reviewed shows significant hypokalemia, lipase is within normal limits  Radiology: ordered. Decision-making details documented in ED Course.     Details: CT of the abdomen and pelvis shows Fluid-filled colon with wall thickening concerning for colitis.     At the descending/sigmoid colon junction there is questionable shouldering of the fluid within the colon.  Consider correlation with colonoscopy once the patient's acute symptoms have resolved to better exclude underlying mass.      Risk  Prescription drug management.  Risk Details: Differential diagnosis includes but is not limited to:  Colitis, bowel obstruction, volvulus, UTI, electrolyte abnormality                ED Medication(s):  Medications   ondansetron injection 4 mg (4 mg Intravenous Not Given 7/2/24 1314)   morphine injection 4 mg (4 mg Intravenous Not Given 7/2/24 1314)   sodium chloride 0.9% bolus 1,000 mL 1,000 mL (0 mLs Intravenous Stopped 7/2/24 1814)   potassium chloride 10 mEq in 100 mL IVPB (0 mEq Intravenous Stopped 7/2/24 1644)   iohexoL (OMNIPAQUE 350) injection 100 mL (100 mLs Intravenous Given 7/2/24 1426)   potassium chloride SA CR tablet 60 mEq (60 mEq Oral Given 7/2/24 1539)   hydrALAZINE injection 10 mg (10 mg Intravenous Given 7/2/24 2013)   potassium chloride 10 mEq in 100 mL IVPB (10 mEq Intravenous New Bag 7/2/24 2145)   potassium chloride 10 mEq in 100 mL IVPB (10 mEq Intravenous New Bag 7/4/24 1011)       Discharge Medication List as of 7/4/2024  1:50 PM        START taking these medications    Details   ciprofloxacin HCl (CIPRO) 500 MG tablet Take 1 tablet (500 mg total) by mouth 2 (two) times daily. for 7 days, Starting Thu 7/4/2024, Until Thu 7/11/2024, Normal      metroNIDAZOLE (FLAGYL) 500 MG tablet Take 1 tablet (500 mg total) by mouth 3 (three) times daily. for 7 days, Starting Thu  7/4/2024, Until Thu 7/11/2024, Normal      potassium chloride 10% (KAYCIEL) 20 mEq/15 mL oral solution Take 30 mLs (40 mEq total) by mouth 2 (two) times daily. for 3 days, Starting Thu 7/4/2024, Until Sun 7/7/2024, Normal              Follow-up Information       Moose Kruse, NP Follow up in 1 week(s).    Specialty: Family Medicine  Contact information:  [address]  [facility] [address] [phone]                                 Scribe Attestation:   Scribe #1: I performed the above scribed service and the documentation accurately describes the services I performed. I attest to the accuracy of the note.     Attending:   Physician Attestation Statement for Scribe #1: I, Tony Cifuentes DO, personally performed the services described in this documentation, as scribed by Radu Davis, in my presence, and it is both accurate and complete.           Clinical Impression       ICD-10-CM ICD-9-CM   1. Colitis  K52.9 558.9   2. Hypokalemia  E87.6 276.8   3. Hypertension, unspecified type  I10 401.9   4. Chest pain  R07.9 786.50       Disposition:   Disposition: Placed in Observation  Condition: Tony Johnson DO  07/05/24 0658

## 2024-07-03 ENCOUNTER — DOCUMENTATION ONLY (OUTPATIENT)
Dept: FAMILY MEDICINE | Facility: CLINIC | Age: 79
End: 2024-07-03
Payer: MEDICARE

## 2024-07-03 LAB
ALBUMIN SERPL BCP-MCNC: 3.2 G/DL (ref 3.5–5.2)
ALP SERPL-CCNC: 119 U/L (ref 55–135)
ALT SERPL W/O P-5'-P-CCNC: 15 U/L (ref 10–44)
ANION GAP SERPL CALC-SCNC: 14 MMOL/L (ref 8–16)
AST SERPL-CCNC: 21 U/L (ref 10–40)
BASOPHILS # BLD AUTO: 0.04 K/UL (ref 0–0.2)
BASOPHILS NFR BLD: 0.6 % (ref 0–1.9)
BILIRUB SERPL-MCNC: 0.6 MG/DL (ref 0.1–1)
BUN SERPL-MCNC: 12 MG/DL (ref 8–23)
C DIFF GDH STL QL: NEGATIVE
C DIFF TOX A+B STL QL IA: NEGATIVE
CALCIUM SERPL-MCNC: 9.1 MG/DL (ref 8.7–10.5)
CHLORIDE SERPL-SCNC: 102 MMOL/L (ref 95–110)
CO2 SERPL-SCNC: 21 MMOL/L (ref 23–29)
CREAT SERPL-MCNC: 1.1 MG/DL (ref 0.5–1.4)
DIFFERENTIAL METHOD BLD: ABNORMAL
EOSINOPHIL # BLD AUTO: 0.1 K/UL (ref 0–0.5)
EOSINOPHIL NFR BLD: 1.2 % (ref 0–8)
ERYTHROCYTE [DISTWIDTH] IN BLOOD BY AUTOMATED COUNT: 13.1 % (ref 11.5–14.5)
EST. GFR  (NO RACE VARIABLE): 51 ML/MIN/1.73 M^2
GLUCOSE SERPL-MCNC: 102 MG/DL (ref 70–110)
HCT VFR BLD AUTO: 36.6 % (ref 37–48.5)
HGB BLD-MCNC: 11.7 G/DL (ref 12–16)
IMM GRANULOCYTES # BLD AUTO: 0.03 K/UL (ref 0–0.04)
IMM GRANULOCYTES NFR BLD AUTO: 0.4 % (ref 0–0.5)
LYMPHOCYTES # BLD AUTO: 1.3 K/UL (ref 1–4.8)
LYMPHOCYTES NFR BLD: 19.6 % (ref 18–48)
MAGNESIUM SERPL-MCNC: 1.8 MG/DL (ref 1.6–2.6)
MCH RBC QN AUTO: 29.8 PG (ref 27–31)
MCHC RBC AUTO-ENTMCNC: 32 G/DL (ref 32–36)
MCV RBC AUTO: 93 FL (ref 82–98)
MONOCYTES # BLD AUTO: 0.7 K/UL (ref 0.3–1)
MONOCYTES NFR BLD: 10.5 % (ref 4–15)
NEUTROPHILS # BLD AUTO: 4.6 K/UL (ref 1.8–7.7)
NEUTROPHILS NFR BLD: 67.7 % (ref 38–73)
NRBC BLD-RTO: 0 /100 WBC
OB PNL STL: NEGATIVE
PLATELET # BLD AUTO: 398 K/UL (ref 150–450)
PMV BLD AUTO: 10.1 FL (ref 9.2–12.9)
POTASSIUM SERPL-SCNC: 2.8 MMOL/L (ref 3.5–5.1)
PROT SERPL-MCNC: 7.7 G/DL (ref 6–8.4)
RBC # BLD AUTO: 3.93 M/UL (ref 4–5.4)
SODIUM SERPL-SCNC: 137 MMOL/L (ref 136–145)
T4 SERPL-MCNC: 8.6 UG/DL (ref 4.5–11.5)
WBC # BLD AUTO: 6.79 K/UL (ref 3.9–12.7)
WBC #/AREA STL HPF: NORMAL /[HPF]

## 2024-07-03 PROCEDURE — 25000003 PHARM REV CODE 250: Performed by: INTERNAL MEDICINE

## 2024-07-03 PROCEDURE — 87449 NOS EACH ORGANISM AG IA: CPT | Performed by: INTERNAL MEDICINE

## 2024-07-03 PROCEDURE — 25000003 PHARM REV CODE 250: Performed by: FAMILY MEDICINE

## 2024-07-03 PROCEDURE — 87046 STOOL CULTR AEROBIC BACT EA: CPT | Performed by: INTERNAL MEDICINE

## 2024-07-03 PROCEDURE — 89055 LEUKOCYTE ASSESSMENT FECAL: CPT | Performed by: INTERNAL MEDICINE

## 2024-07-03 PROCEDURE — 36415 COLL VENOUS BLD VENIPUNCTURE: CPT | Performed by: INTERNAL MEDICINE

## 2024-07-03 PROCEDURE — 21400001 HC TELEMETRY ROOM

## 2024-07-03 PROCEDURE — 80053 COMPREHEN METABOLIC PANEL: CPT | Performed by: INTERNAL MEDICINE

## 2024-07-03 PROCEDURE — 87427 SHIGA-LIKE TOXIN AG IA: CPT | Mod: 59 | Performed by: INTERNAL MEDICINE

## 2024-07-03 PROCEDURE — 82272 OCCULT BLD FECES 1-3 TESTS: CPT | Performed by: INTERNAL MEDICINE

## 2024-07-03 PROCEDURE — 87329 GIARDIA AG IA: CPT | Performed by: INTERNAL MEDICINE

## 2024-07-03 PROCEDURE — 85025 COMPLETE CBC W/AUTO DIFF WBC: CPT | Performed by: INTERNAL MEDICINE

## 2024-07-03 PROCEDURE — 87449 NOS EACH ORGANISM AG IA: CPT | Mod: 91 | Performed by: INTERNAL MEDICINE

## 2024-07-03 PROCEDURE — 87324 CLOSTRIDIUM AG IA: CPT | Performed by: INTERNAL MEDICINE

## 2024-07-03 PROCEDURE — 83735 ASSAY OF MAGNESIUM: CPT | Performed by: INTERNAL MEDICINE

## 2024-07-03 PROCEDURE — 87045 FECES CULTURE AEROBIC BACT: CPT | Performed by: INTERNAL MEDICINE

## 2024-07-03 PROCEDURE — 63600175 PHARM REV CODE 636 W HCPCS: Performed by: INTERNAL MEDICINE

## 2024-07-03 PROCEDURE — 27000207 HC ISOLATION

## 2024-07-03 RX ORDER — POTASSIUM CHLORIDE 20 MEQ/1
40 TABLET, EXTENDED RELEASE ORAL 2 TIMES DAILY
Status: DISCONTINUED | OUTPATIENT
Start: 2024-07-03 | End: 2024-07-04 | Stop reason: HOSPADM

## 2024-07-03 RX ADMIN — MORPHINE SULFATE 2 MG: 4 INJECTION INTRAVENOUS at 09:07

## 2024-07-03 RX ADMIN — POTASSIUM CHLORIDE 40 MEQ: 1500 TABLET, EXTENDED RELEASE ORAL at 08:07

## 2024-07-03 RX ADMIN — METRONIDAZOLE 500 MG: 500 INJECTION, SOLUTION INTRAVENOUS at 08:07

## 2024-07-03 RX ADMIN — SODIUM CHLORIDE, SODIUM LACTATE, POTASSIUM CHLORIDE, AND CALCIUM CHLORIDE: 600; 310; 30; 20 INJECTION, SOLUTION INTRAVENOUS at 08:07

## 2024-07-03 RX ADMIN — MORPHINE SULFATE 2 MG: 4 INJECTION INTRAVENOUS at 06:07

## 2024-07-03 RX ADMIN — CIPROFLOXACIN 400 MG: 2 INJECTION, SOLUTION INTRAVENOUS at 09:07

## 2024-07-03 RX ADMIN — POTASSIUM CHLORIDE 40 MEQ: 1500 TABLET, EXTENDED RELEASE ORAL at 09:07

## 2024-07-03 RX ADMIN — METRONIDAZOLE 500 MG: 500 INJECTION, SOLUTION INTRAVENOUS at 03:07

## 2024-07-03 RX ADMIN — ENOXAPARIN SODIUM 40 MG: 40 INJECTION SUBCUTANEOUS at 06:07

## 2024-07-03 RX ADMIN — HYDRALAZINE HYDROCHLORIDE 10 MG: 20 INJECTION, SOLUTION INTRAMUSCULAR; INTRAVENOUS at 08:07

## 2024-07-03 RX ADMIN — AMLODIPINE BESYLATE 5 MG: 5 TABLET ORAL at 09:07

## 2024-07-03 RX ADMIN — METRONIDAZOLE 500 MG: 500 INJECTION, SOLUTION INTRAVENOUS at 12:07

## 2024-07-03 RX ADMIN — SODIUM CHLORIDE, SODIUM LACTATE, POTASSIUM CHLORIDE, AND CALCIUM CHLORIDE: 600; 310; 30; 20 INJECTION, SOLUTION INTRAVENOUS at 09:07

## 2024-07-03 NOTE — ASSESSMENT & PLAN NOTE
Patient has hypokalemia which is Acute and currently uncontrolled. Most recent potassium levels reviewed-   Lab Results   Component Value Date    K 2.8 (L) 07/03/2024   . Will continue potassium replacement per protocol and recheck repeat levels after replacement completed.   -potassium on admit 2.4 and currently 2.9, magnesium level 1.9, status post potassium chloride 10 mEq IV and potassium chloride 60 mEq p.o. given in the emergency department  -give additional potassium chloride 20 mEq IV, continue p.o. potassium b.i.d. x3 days  -telemetry monitoring  -check a.m. labs

## 2024-07-03 NOTE — ASSESSMENT & PLAN NOTE
"-patient presents with lower quadrant abdominal pain, intermittent nausea with vomiting, and persistent diarrhea  -CT scan of abdomen and pelvis with "fluid-filled colon with wall thickening concerning for colitis; at the descending/sigmoid colon junction there is questionable shouldering of the fluid within the colon, consider correlation with colonoscopy once the patient's acute symptoms have resolved to better exclude underlying mass."  -white blood cell count 6.65, LFTs unremarkable, lipase 16, urinalysis abnormal, afebrile  -continue cardiac low-sodium diet as patient reports she is now tolerating a regular consistency diet with no further nausea and vomiting  -IV antibiotics with Cipro and Flagyl, IV fluids with LR at 75 mL/hr, p.r.n. pain/nausea control  -check stool studies to include stool culture, Giardia/Cryptosporidium, fecal white blood cell, occult blood, and C diff pending  -check TSH, free T4, lactic acid level, and ESR-reviewed  -check KUB in a.m. -Nonobstructive small bowel gas pattern.     "

## 2024-07-03 NOTE — SUBJECTIVE & OBJECTIVE
Interval History:  Follow up for infectious colitis.  Stool studies pending.  We will replace potassium and monitor her electrolytes closely give your ongoing diarrhea through.  Of note renal function has mildly declined overnight but still within normal limits.  Plan of care discussed with the patient and her niece at bedside.    Review of Systems  Objective:     Vital Signs (Most Recent):  Temp: 98 °F (36.7 °C) (07/03/24 1203)  Pulse: 87 (07/03/24 1203)  Resp: 18 (07/03/24 1203)  BP: (!) 156/65 (07/03/24 1203)  SpO2: 97 % (07/03/24 1203) Vital Signs (24h Range):  Temp:  [96.9 °F (36.1 °C)-98.6 °F (37 °C)] 98 °F (36.7 °C)  Pulse:  [] 87  Resp:  [15-20] 18  SpO2:  [96 %-100 %] 97 %  BP: (134-203)/(62-95) 156/65     Weight: 63.1 kg (139 lb 1.8 oz)  Body mass index is 22.45 kg/m².    Intake/Output Summary (Last 24 hours) at 7/3/2024 1545  Last data filed at 7/2/2024 1814  Gross per 24 hour   Intake 1100 ml   Output --   Net 1100 ml         Physical Exam  Vitals and nursing note reviewed.   Constitutional:       Appearance: Normal appearance.   HENT:      Head: Normocephalic and atraumatic.      Nose: Nose normal.      Mouth/Throat:      Mouth: Mucous membranes are moist.   Eyes:      Extraocular Movements: Extraocular movements intact.      Conjunctiva/sclera: Conjunctivae normal.   Cardiovascular:      Rate and Rhythm: Normal rate and regular rhythm.      Pulses: Normal pulses.      Heart sounds: Normal heart sounds.   Pulmonary:      Effort: Pulmonary effort is normal.      Breath sounds: Normal breath sounds.   Abdominal:      General: Abdomen is flat. Bowel sounds are normal.      Palpations: Abdomen is soft.   Musculoskeletal:         General: Normal range of motion.      Cervical back: Normal range of motion and neck supple.   Skin:     General: Skin is warm.      Capillary Refill: Capillary refill takes less than 2 seconds.   Neurological:      Mental Status: She is alert and oriented to person, place,  and time. Mental status is at baseline.   Psychiatric:         Mood and Affect: Mood normal.         Behavior: Behavior normal.             Significant Labs: All pertinent labs within the past 24 hours have been reviewed.  Recent Lab Results  (Last 5 results in the past 24 hours)        07/03/24  0758   07/03/24  0425   07/02/24  2219   07/02/24  2157   07/02/24  2030        Influenza A, Molecular       Negative         Influenza B, Molecular       Negative         Albumin   3.2             ALP   119             ALT   15             Anion Gap   14             AST   21             Baso #   0.04             Basophil %   0.6             BILIRUBIN TOTAL   0.6  Comment: For infants and newborns, interpretation of results should be based  on gestational age, weight and in agreement with clinical  observations.    Premature Infant recommended reference ranges:  Up to 24 hours.............<8.0 mg/dL  Up to 48 hours............<12.0 mg/dL  3-5 days..................<15.0 mg/dL  6-29 days.................<15.0 mg/dL               BUN   12             C difficile Toxins A+B, EIA Negative  Comment: Testing not recommended for children <24 months old.               C. diff Antigen Negative               Calcium   9.1             Chloride   102             CO2   21             Creatinine   1.1             Differential Method   Automated             eGFR   51             Eos #   0.1             Eos %   1.2             Flu A & B Source       Nasal swab         Glucose   102             Gran # (ANC)   4.6             Gran %   67.7             Hematocrit   36.6             Hemoglobin   11.7             Immature Grans (Abs)   0.03  Comment: Mild elevation in immature granulocytes is non specific and   can be seen in a variety of conditions including stress response,   acute inflammation, trauma and pregnancy. Correlation with other   laboratory and clinical findings is essential.               Immature Granulocytes   0.4              Lactic Acid Level         1.0  Comment: Falsely low lactic acid results can be found in samples   containing >=13.0 mg/dL total bilirubin and/or >=3.5 mg/dL   direct bilirubin.         Lymph #   1.3             Lymph %   19.6             Magnesium    1.8             MCH   29.8             MCHC   32.0             MCV   93             Mono #   0.7             Mono %   10.5             MPV   10.1             nRBC   0             Occult Blood Negative               Platelet Count   398             Potassium   2.8   2.8           PROTEIN TOTAL   7.7             RBC   3.93             RDW   13.1             SARS-CoV-2 RNA, Amplification, Qual       Negative  Comment: This test utilizes isothermal nucleic acid amplification technology   to   detect the SARS-CoV-2 RdRp nucleic acid segment. The analytical   sensitivity   (limit of detection) is 500 copies/swab.    A POSITIVE result is indicative of the presence of SARS-CoV-2 RNA;   clinical   correlation with patient history and other diagnostic information is   necessary to determine patient infection status.    A NEGATIVE result means that SARS-CoV-2 nucleic acids are not present   above   the limit of detection. A NEGATIVE result should be treated as   presumptive.   It does not rule out the possibility of COVID-19 and should not be   the sole   basis for treatment decisions.    If COVID-19 is strongly suspected based on clinical and exposure   history,   re-testing using an alternate molecular assay should be considered.    This test is Food and Drug Administration (FDA) approved. Performance   characteristics of this has been independently verified by [facility] Department of Pathology and Laboratory Medicine.           Sed Rate     55           Sodium   137             T4 Total     8.6           TSH     3.440           WBC   6.79                                    Significant Imaging:   X-Ray Abdomen Flat And Erect   Final Result      Nonobstructive  small bowel gas pattern.         Electronically signed by: Armand Del Cid   Date:    07/03/2024   Time:    08:19      CT Abdomen Pelvis With IV Contrast NO Oral Contrast   Final Result      Fluid-filled colon with wall thickening concerning for colitis.      At the descending/sigmoid colon junction there is questionable shouldering of the fluid within the colon.  Consider correlation with colonoscopy once the patient's acute symptoms have resolved to better exclude underlying mass.      Complete findings as above.      All CT scans at this facility are performed  using dose modulation techniques as appropriate to performed exam including the following:  automated exposure control; adjustment of mA and/or kV according to the patients size (this includes techniques or standardized protocols for targeted exams where dose is matched to indication/reason for exam: i.e. extremities or head);  iterative reconstruction technique.         Electronically signed by: Armand Del Cid   Date:    07/02/2024   Time:    14:56

## 2024-07-03 NOTE — ASSESSMENT & PLAN NOTE
Patient has hypokalemia which is Acute and currently uncontrolled. Most recent potassium levels reviewed-   Lab Results   Component Value Date    K 2.9 (L) 07/02/2024   . Will continue potassium replacement per protocol and recheck repeat levels after replacement completed.   -potassium on admit 2.4 and currently 2.9, magnesium level 1.9, status post potassium chloride 10 mEq IV and potassium chloride 60 mEq p.o. given in the emergency department  -give additional potassium chloride 20 mEq IV, repeat potassium level now  -telemetry monitoring  -check a.m. labs

## 2024-07-03 NOTE — ASSESSMENT & PLAN NOTE
Patient has a current diagnosis of hypertensive urgency (without evidence of end organ damage) which is uncontrolled.  Latest blood pressure and vitals reviewed-   Temp:  [96.9 °F (36.1 °C)-98.6 °F (37 °C)]   Pulse:  []   Resp:  [15-20]   BP: (134-203)/(62-95)   SpO2:  [96 %-100 %] .   Patient currently off IV antihypertensives.   Home meds for hypertension were reviewed and noted below.   Hypertension Medications               amLODIPine (NORVASC) 5 MG tablet Take 1 tablet (5 mg total) by mouth once daily.            Medication adjustment for hospital antihypertensives is as follows- start Norvasc 5 mg p.o. daily, 1st dose now.  Give hydralazine 10 mg IV x1 dose now.  P.r.n. IV hydralazine with parameters.  Additional antihypertensives as needed.    Will aim for controlled BP reduction by medications noted above. Monitor and mitigate end organ damage as indicated.

## 2024-07-03 NOTE — ASSESSMENT & PLAN NOTE
Patient has a current diagnosis of hypertensive urgency (without evidence of end organ damage) which is uncontrolled.  Latest blood pressure and vitals reviewed-   Temp:  [98.2 °F (36.8 °C)]   Pulse:  []   Resp:  [15-20]   BP: (180-209)/(81-95)   SpO2:  [95 %-100 %] .   Patient currently off IV antihypertensives.   Home meds for hypertension were reviewed and noted below.   Hypertension Medications               amLODIPine (NORVASC) 5 MG tablet Take 1 tablet (5 mg total) by mouth once daily.            Medication adjustment for hospital antihypertensives is as follows- start Norvasc 5 mg p.o. daily, 1st dose now.  Give hydralazine 10 mg IV x1 dose now.  P.r.n. IV hydralazine with parameters.  Additional antihypertensives as needed.    Will aim for controlled BP reduction by medications noted above. Monitor and mitigate end organ damage as indicated.

## 2024-07-03 NOTE — ED NOTES
Assisted pt via wheelchair to bathroom. Pt does not require any assistance with toileting, niece at bedside.

## 2024-07-03 NOTE — HPI
78-year-old white woman with history of hypertension who presents to the emergency department with complaint of abdominal pain over the last 2-3 weeks after eating red beans and rice.  Abdominal pain is localized to the lower abdomen, intermittent, 10/10 on the pain scale at its worst and currently improved after IV morphine administration in the emergency department.  No aggravating or alleviating factors identified.  Patient has had several episodes of nausea with vomiting.  She has had diarrhea over the last 2-3 weeks, frequent episodes, moderate-to-large volume, brown and nonbloody.  She denies any associated fevers or chills.  She denies any dysuria or hematuria but has had urinary frequency.  Patient is currently tolerating a regular diet by her account.

## 2024-07-03 NOTE — ASSESSMENT & PLAN NOTE
-patient only complains of urinary frequency and lower abdominal pain  -creatinine 1.0  -check urine culture  -empiric IV Cipro

## 2024-07-03 NOTE — PROGRESS NOTES
"To [facility] ER on 7/2/24 per provider rec:    Per ER/admission note:    Chief Complaint   Patient presents with    Abdominal Pain       Pt reports lower abdominal pain x 2/3 weeks. Per niece, pt had a CT scan done yesterday and has a bowel obstruction.       HPI: 78-year-old white woman with history of hypertension who presents to the emergency department with complaint of abdominal pain over the last 2-3 weeks after eating red beans and rice.  Abdominal pain is localized to the lower abdomen, intermittent, 10/10 on the pain scale at its worst and currently improved after IV morphine administration in the emergency department.  No aggravating or alleviating factors identified.  Patient has had several episodes of nausea with vomiting.  She has had diarrhea over the last 2-3 weeks, frequent episodes, moderate-to-large volume, brown and nonbloody.  She denies any associated fevers or chills.  She denies any dysuria or hematuria but has had urinary frequency.  Patient is currently tolerating a regular diet by her account.        CT Abdomen Pelvis With IV Contrast NO Oral Contrast   Final Result       Fluid-filled colon with wall thickening concerning for colitis.       At the descending/sigmoid colon junction there is questionable shouldering of the fluid within the colon.  Consider correlation with colonoscopy once the patient's acute symptoms have resolved to better exclude underlying mass.          Assessment/Plan:      Colitis  -patient presents with lower quadrant abdominal pain, intermittent nausea with vomiting, and persistent diarrhea  -CT scan of abdomen and pelvis with "fluid-filled colon with wall thickening concerning for colitis; at the descending/sigmoid colon junction there is questionable shouldering of the fluid within the colon, consider correlation with colonoscopy once the patient's acute symptoms have resolved to better exclude underlying mass."  -white blood cell count 6.65, LFTs unremarkable, " lipase 16, urinalysis abnormal, afebrile  -continue cardiac low-sodium diet as patient reports she is now tolerating a regular consistency diet with no further nausea and vomiting  -IV antibiotics with Cipro and Flagyl, IV fluids with LR at 75 mL/hr, p.r.n. pain/nausea control  -check stool studies to include stool culture, Giardia/Cryptosporidium, fecal white blood cell, occult blood, and C diff  - will also check influenza a/B and COVID-19 in the setting of nausea, vomiting, and diarrhea  -check TSH, free T4, lactic acid level, and ESR  -check KUB in a.m.        Diarrhea  See discussion for colitis        Hypokalemia  Patient has hypokalemia which is Acute and currently uncontrolled. Most recent potassium levels reviewed    Lab Results   Component Value Date     K 2.9 (L) 07/02/2024     - Will continue potassium replacement per protocol and recheck repeat levels after replacement completed.   -potassium on admit 2.4 and currently 2.9, magnesium level 1.9, status post potassium chloride 10 mEq IV and potassium chloride 60 mEq p.o. given in the emergency department  -give additional potassium chloride 20 mEq IV, repeat potassium level now  -telemetry monitoring  -check a.m. labs     Hypertensive urgency  Patient has a current diagnosis of hypertensive urgency (without evidence of end organ damage) which is uncontrolled.  Latest blood pressure and vitals reviewed-   Temp:  [98.2 °F (36.8 °C)]   Pulse:  []   Resp:  [15-20]   BP: (180-209)/(81-95)   SpO2:  [95 %-100 %] .   Patient currently off IV antihypertensives.   Home meds for hypertension were reviewed and noted below.       Hypertension Medications                    amLODIPine (NORVASC) 5 MG tablet Take 1 tablet (5 mg total) by mouth once daily.         Medication adjustment for hospital antihypertensives is as follows- start Norvasc 5 mg p.o. daily, 1st dose now.  Give hydralazine 10 mg IV x1 dose now.  P.r.n. IV hydralazine with parameters.  Additional  antihypertensives as needed.     Will aim for controlled BP reduction by medications noted above. Monitor and mitigate end organ damage as indicated.     Abnormal urinalysis  -patient only complains of urinary frequency and lower abdominal pain  -creatinine 1.0  -check urine culture  -empiric IV Cipro           VTE Risk Mitigation (From admission, onward)              Ordered       enoxaparin injection 40 mg  Daily         07/02/24 1933       IP VTE HIGH RISK PATIENT  Once         07/02/24 1933       Place sequential compression device  Until discontinued         07/02/24 1933              On 07/02/2024, patient should be placed in hospital observation services under my care.  Per Dr. Rosa Hathaway

## 2024-07-03 NOTE — HOSPITAL COURSE
Patient admitted with infectious colitis.  She continues to have diarrhea (6-7 BM since admit).  Stool studies pending.  Hypokalemia noted and PO potassium started.  Given her ongoing diarrhea her potassium will be closely monitored.  She does state that her abdominal pain has improved since being admitted.  She also denies any additional nausea and vomiting and patient has been started on a low-sodium diet.  In the last 24 hours patient's niece at bedside stated that she only had 2-3 bowel movements patient states that they are now more formed.  Stool studies were negative with the exception of giardia which is pending.  Patient has been sent out on Cipro and Flagyl x7 days.  She has also been counseled on following up with her PCP.  Patient's blood pressure was noted to be elevated during hospitalization and her Norvasc was increased to 10 daily.  Plan of care was discussed with patient and niece at bedside.

## 2024-07-03 NOTE — PROGRESS NOTES
[facility] Medicine  Progress Note    Patient Name: Isabel Miles  MRN: [id number]  Patient Class: IP- Inpatient   Admission Date: 7/2/2024  Length of Stay: 0 days  Attending Physician: Thao Au MD  Primary Care Provider: Lynn, Primary Doctor        Subjective:     Principal Problem:Colitis        HPI:  78-year-old white woman with history of hypertension who presents to the emergency department with complaint of abdominal pain over the last 2-3 weeks after eating red beans and rice.  Abdominal pain is localized to the lower abdomen, intermittent, 10/10 on the pain scale at its worst and currently improved after IV morphine administration in the emergency department.  No aggravating or alleviating factors identified.  Patient has had several episodes of nausea with vomiting.  She has had diarrhea over the last 2-3 weeks, frequent episodes, moderate-to-large volume, brown and nonbloody.  She denies any associated fevers or chills.  She denies any dysuria or hematuria but has had urinary frequency.  Patient is currently tolerating a regular diet by her account.    Overview/Hospital Course:  Patient admitted with infectious colitis.  She continues to have diarrhea (6-7 BM since admit).  Stool studies pending.  Hypokalemia noted and PO potassium started.  Given her ongoing diarrhea her potassium will be closely monitored.  She does state that her abdominal pain has improved since being admitted.  She also denies any additional nausea and vomiting and patient has been started on a low-sodium diet.    Interval History:  Follow up for infectious colitis.  Stool studies pending.  We will replace potassium and monitor her electrolytes closely give your ongoing diarrhea through.  Of note renal function has mildly declined overnight but still within normal limits.  Plan of care discussed with the patient and her niece at bedside.    Review of Systems  Objective:     Vital Signs (Most  Recent):  Temp: 98 °F (36.7 °C) (07/03/24 1203)  Pulse: 87 (07/03/24 1203)  Resp: 18 (07/03/24 1203)  BP: (!) 156/65 (07/03/24 1203)  SpO2: 97 % (07/03/24 1203) Vital Signs (24h Range):  Temp:  [96.9 °F (36.1 °C)-98.6 °F (37 °C)] 98 °F (36.7 °C)  Pulse:  [] 87  Resp:  [15-20] 18  SpO2:  [96 %-100 %] 97 %  BP: (134-203)/(62-95) 156/65     Weight: 63.1 kg (139 lb 1.8 oz)  Body mass index is 22.45 kg/m².    Intake/Output Summary (Last 24 hours) at 7/3/2024 1545  Last data filed at 7/2/2024 1814  Gross per 24 hour   Intake 1100 ml   Output --   Net 1100 ml         Physical Exam  Vitals and nursing note reviewed.   Constitutional:       Appearance: Normal appearance.   HENT:      Head: Normocephalic and atraumatic.      Nose: Nose normal.      Mouth/Throat:      Mouth: Mucous membranes are moist.   Eyes:      Extraocular Movements: Extraocular movements intact.      Conjunctiva/sclera: Conjunctivae normal.   Cardiovascular:      Rate and Rhythm: Normal rate and regular rhythm.      Pulses: Normal pulses.      Heart sounds: Normal heart sounds.   Pulmonary:      Effort: Pulmonary effort is normal.      Breath sounds: Normal breath sounds.   Abdominal:      General: Abdomen is flat. Bowel sounds are normal.      Palpations: Abdomen is soft.   Musculoskeletal:         General: Normal range of motion.      Cervical back: Normal range of motion and neck supple.   Skin:     General: Skin is warm.      Capillary Refill: Capillary refill takes less than 2 seconds.   Neurological:      Mental Status: She is alert and oriented to person, place, and time. Mental status is at baseline.   Psychiatric:         Mood and Affect: Mood normal.         Behavior: Behavior normal.             Significant Labs: All pertinent labs within the past 24 hours have been reviewed.  Recent Lab Results  (Last 5 results in the past 24 hours)        07/03/24  0758   07/03/24  0425   07/02/24  2219   07/02/24  2157   07/02/24  2030        Influenza  A, Molecular       Negative         Influenza B, Molecular       Negative         Albumin   3.2             ALP   119             ALT   15             Anion Gap   14             AST   21             Baso #   0.04             Basophil %   0.6             BILIRUBIN TOTAL   0.6  Comment: For infants and newborns, interpretation of results should be based  on gestational age, weight and in agreement with clinical  observations.    Premature Infant recommended reference ranges:  Up to 24 hours.............<8.0 mg/dL  Up to 48 hours............<12.0 mg/dL  3-5 days..................<15.0 mg/dL  6-29 days.................<15.0 mg/dL               BUN   12             C difficile Toxins A+B, EIA Negative  Comment: Testing not recommended for children <24 months old.               C. diff Antigen Negative               Calcium   9.1             Chloride   102             CO2   21             Creatinine   1.1             Differential Method   Automated             eGFR   51             Eos #   0.1             Eos %   1.2             Flu A & B Source       Nasal swab         Glucose   102             Gran # (ANC)   4.6             Gran %   67.7             Hematocrit   36.6             Hemoglobin   11.7             Immature Grans (Abs)   0.03  Comment: Mild elevation in immature granulocytes is non specific and   can be seen in a variety of conditions including stress response,   acute inflammation, trauma and pregnancy. Correlation with other   laboratory and clinical findings is essential.               Immature Granulocytes   0.4             Lactic Acid Level         1.0  Comment: Falsely low lactic acid results can be found in samples   containing >=13.0 mg/dL total bilirubin and/or >=3.5 mg/dL   direct bilirubin.         Lymph #   1.3             Lymph %   19.6             Magnesium    1.8             MCH   29.8             MCHC   32.0             MCV   93             Mono #   0.7             Mono %   10.5             MPV    10.1             nRBC   0             Occult Blood Negative               Platelet Count   398             Potassium   2.8   2.8           PROTEIN TOTAL   7.7             RBC   3.93             RDW   13.1             SARS-CoV-2 RNA, Amplification, Qual       Negative  Comment: This test utilizes isothermal nucleic acid amplification technology   to   detect the SARS-CoV-2 RdRp nucleic acid segment. The analytical   sensitivity   (limit of detection) is 500 copies/swab.    A POSITIVE result is indicative of the presence of SARS-CoV-2 RNA;   clinical   correlation with patient history and other diagnostic information is   necessary to determine patient infection status.    A NEGATIVE result means that SARS-CoV-2 nucleic acids are not present   above   the limit of detection. A NEGATIVE result should be treated as   presumptive.   It does not rule out the possibility of COVID-19 and should not be   the sole   basis for treatment decisions.    If COVID-19 is strongly suspected based on clinical and exposure   history,   re-testing using an alternate molecular assay should be considered.    This test is Food and Drug Administration (FDA) approved. Performance   characteristics of this has been independently verified by [facility] Department of Pathology and Laboratory Medicine.           Sed Rate     55           Sodium   137             T4 Total     8.6           TSH     3.440           WBC   6.79                                    Significant Imaging:   X-Ray Abdomen Flat And Erect   Final Result      Nonobstructive small bowel gas pattern.         Electronically signed by: Armand Del Cid   Date:    07/03/2024   Time:    08:19      CT Abdomen Pelvis With IV Contrast NO Oral Contrast   Final Result      Fluid-filled colon with wall thickening concerning for colitis.      At the descending/sigmoid colon junction there is questionable shouldering of the fluid within the colon.  Consider correlation with  "colonoscopy once the patient's acute symptoms have resolved to better exclude underlying mass.      Complete findings as above.      All CT scans at this facility are performed  using dose modulation techniques as appropriate to performed exam including the following:  automated exposure control; adjustment of mA and/or kV according to the patients size (this includes techniques or standardized protocols for targeted exams where dose is matched to indication/reason for exam: i.e. extremities or head);  iterative reconstruction technique.         Electronically signed by: Armand Del Cid   Date:    07/02/2024   Time:    14:56           Assessment/Plan:      * Colitis  -patient presents with lower quadrant abdominal pain, intermittent nausea with vomiting, and persistent diarrhea  -CT scan of abdomen and pelvis with "fluid-filled colon with wall thickening concerning for colitis; at the descending/sigmoid colon junction there is questionable shouldering of the fluid within the colon, consider correlation with colonoscopy once the patient's acute symptoms have resolved to better exclude underlying mass."  -white blood cell count 6.65, LFTs unremarkable, lipase 16, urinalysis abnormal, afebrile  -continue cardiac low-sodium diet as patient reports she is now tolerating a regular consistency diet with no further nausea and vomiting  -IV antibiotics with Cipro and Flagyl, IV fluids with LR at 75 mL/hr, p.r.n. pain/nausea control  -check stool studies to include stool culture, Giardia/Cryptosporidium, fecal white blood cell, occult blood, and C diff pending  -check TSH, free T4, lactic acid level, and ESR-reviewed  -check KUB in a.m. -Nonobstructive small bowel gas pattern.       Abnormal urinalysis  -patient only complains of urinary frequency and lower abdominal pain  -creatinine 1.0  -check urine culture  -empiric IV Cipro      Hypertensive urgency  Patient has a current diagnosis of hypertensive urgency (without evidence " of end organ damage) which is uncontrolled.  Latest blood pressure and vitals reviewed-   Temp:  [96.9 °F (36.1 °C)-98.6 °F (37 °C)]   Pulse:  []   Resp:  [15-20]   BP: (134-203)/(62-95)   SpO2:  [96 %-100 %] .   Patient currently off IV antihypertensives.   Home meds for hypertension were reviewed and noted below.   Hypertension Medications               amLODIPine (NORVASC) 5 MG tablet Take 1 tablet (5 mg total) by mouth once daily.            Medication adjustment for hospital antihypertensives is as follows- start Norvasc 5 mg p.o. daily, 1st dose now.  Give hydralazine 10 mg IV x1 dose now.  P.r.n. IV hydralazine with parameters.  Additional antihypertensives as needed.    Will aim for controlled BP reduction by medications noted above. Monitor and mitigate end organ damage as indicated.    Hypokalemia  Patient has hypokalemia which is Acute and currently uncontrolled. Most recent potassium levels reviewed-   Lab Results   Component Value Date    K 2.8 (L) 07/03/2024   . Will continue potassium replacement per protocol and recheck repeat levels after replacement completed.   -potassium on admit 2.4 and currently 2.9, magnesium level 1.9, status post potassium chloride 10 mEq IV and potassium chloride 60 mEq p.o. given in the emergency department  -give additional potassium chloride 20 mEq IV, continue p.o. potassium b.i.d. x3 days  -telemetry monitoring  -check a.m. labs    Diarrhea  See discussion for colitis        VTE Risk Mitigation (From admission, onward)           Ordered     enoxaparin injection 40 mg  Daily         07/02/24 1933     IP VTE HIGH RISK PATIENT  Once         07/02/24 1933     Place sequential compression device  Until discontinued         07/02/24 1933                    Discharge Planning   LUKASZ:      Code Status: Full Code   Is the patient medically ready for discharge?:     Reason for patient still in hospital (select all that apply): Patient trending condition, Laboratory test,  and Treatment  Discharge Plan A: Home with family                  Thao Au MD  Department of Hospital Medicine   [facility] - Telemetry ([facility])

## 2024-07-03 NOTE — H&P
[facility] Emergency Dept.  [facility] Medicine  History & Physical    Patient Name: Isabel Miles  MRN: [id number]  Patient Class: OP- Observation  Admission Date: 7/2/2024  Attending Physician:  Rosa Hathaway MD  Primary Care Provider: Lynn Primary Doctor         Patient information was obtained from patient, relative(s), ER records, and ER physician .     Subjective:     Principal Problem:Colitis    Chief Complaint:   Chief Complaint   Patient presents with    Abdominal Pain     Pt reports lower abdominal pain x 2/3 weeks. Per niece, pt had a CT scan done yesterday and has a bowel obstruction.         HPI: 78-year-old white woman with history of hypertension who presents to the emergency department with complaint of abdominal pain over the last 2-3 weeks after eating red beans and rice.  Abdominal pain is localized to the lower abdomen, intermittent, 10/10 on the pain scale at its worst and currently improved after IV morphine administration in the emergency department.  No aggravating or alleviating factors identified.  Patient has had several episodes of nausea with vomiting.  She has had diarrhea over the last 2-3 weeks, frequent episodes, moderate-to-large volume, brown and nonbloody.  She denies any associated fevers or chills.  She denies any dysuria or hematuria but has had urinary frequency.  Patient is currently tolerating a regular diet by her account.    Past Medical History:   Diagnosis Date    Hypertension        Past Surgical History:   Procedure Laterality Date    CHOLECYSTECTOMY         Review of patient's allergies indicates:  No Known Allergies    No current facility-administered medications on file prior to encounter.     Current Outpatient Medications on File Prior to Encounter   Medication Sig    amLODIPine (NORVASC) 5 MG tablet Take 1 tablet (5 mg total) by mouth once daily.     Family History    None       Tobacco Use    Smoking status: Never    Smokeless tobacco: Never   Substance and Sexual  Activity    Alcohol use: Never    Drug use: Never    Sexual activity: Not on file     Review of Systems   Constitutional:  Negative for chills and fever.   Gastrointestinal:  Positive for abdominal pain, diarrhea, nausea and vomiting. Negative for blood in stool.   Genitourinary:  Positive for frequency. Negative for difficulty urinating, dysuria and hematuria.   All other systems reviewed and are negative.    Objective:     Vital Signs (Most Recent):  Temp: 98.2 °F (36.8 °C) (07/02/24 1231)  Pulse: 84 (07/02/24 1848)  Resp: 20 (07/02/24 1848)  BP: (!) 202/84 (07/02/24 1848)  SpO2: 98 % (07/02/24 1848) Vital Signs (24h Range):  Temp:  [98.2 °F (36.8 °C)] 98.2 °F (36.8 °C)  Pulse:  [] 84  Resp:  [15-20] 20  SpO2:  [95 %-100 %] 98 %  BP: (180-209)/(81-95) 202/84     Weight: 61.7 kg (136 lb 0.4 oz)  Body mass index is 21.95 kg/m².     Physical Exam  Vitals reviewed.   Constitutional:       General: She is not in acute distress.  HENT:      Nose: Nose normal.   Eyes:      Conjunctiva/sclera: Conjunctivae normal.   Cardiovascular:      Rate and Rhythm: Normal rate.   Pulmonary:      Effort: Pulmonary effort is normal. No respiratory distress.   Abdominal:      General: There is distension.      Tenderness: There is abdominal tenderness. There is no rebound.   Musculoskeletal:         General: No swelling.   Skin:     General: Skin is warm and dry.   Neurological:      Mental Status: She is alert and oriented to person, place, and time.   Psychiatric:         Mood and Affect: Mood normal.         Behavior: Behavior normal.                Significant Labs: All pertinent labs within the past 24 hours have been reviewed.  Recent Lab Results         07/02/24  1810   07/02/24  1316   07/02/24  1314        Albumin   3.5         ALP   125         ALT   14         Amorphous, UA     Occasional       Anion Gap 13   12         Appearance, UA     Hazy       AST   22         Bacteria, UA     Many       Baso #   0.05          Basophil %   0.8         Bilirubin (UA)     Negative       BILIRUBIN TOTAL   0.6  Comment: For infants and newborns, interpretation of results should be based  on gestational age, weight and in agreement with clinical  observations.    Premature Infant recommended reference ranges:  Up to 24 hours.............<8.0 mg/dL  Up to 48 hours............<12.0 mg/dL  3-5 days..................<15.0 mg/dL  6-29 days.................<15.0 mg/dL           BUN 12   14         Calcium 8.9   9.4         Chloride 100   98         CO2 23   26         Color, UA     Yellow       Creatinine 1.0   1.2         Differential Method   Automated         eGFR 57   46         Eos #   0.1         Eos %   1.5         Glucose 100   108         Glucose, UA     Negative       Gran # (ANC)   3.7         Gran %   56.0         Hematocrit   36.5         Hemoglobin   12.2         Immature Grans (Abs)   0.01  Comment: Mild elevation in immature granulocytes is non specific and   can be seen in a variety of conditions including stress response,   acute inflammation, trauma and pregnancy. Correlation with other   laboratory and clinical findings is essential.           Immature Granulocytes   0.2         Ketones, UA     Trace       Leukocyte Esterase, UA     2+       Lipase   16         Lymph #   1.9         Lymph %   29.2         Magnesium    1.9         MCH   30.5         MCHC   33.4         MCV   91         Microscopic Comment     SEE COMMENT  Comment: Other formed elements not mentioned in the report are not   present in the microscopic examination.          Mono #   0.8         Mono %   12.3         MPV   9.8         NITRITE UA     Negative       nRBC   0         Blood, UA     1+       pH, UA     7.0       Platelet Count   390         Potassium 2.9   2.4  Comment: POTASSIUM  critical result(s) called and verbal readback obtained   from jimmy beal rn by [facility] 07/02/2024 13:52           PROTEIN TOTAL   8.3         Protein, UA     Negative  Comment:  "Recommend a 24 hour urine protein or a urine   protein/creatinine ratio if globulin induced proteinuria is  clinically suspected.         RBC   4.00         RBC, UA     3       RDW   13.1         Sodium 136   136         Spec Grav UA     1.020       Specimen UA     Urine, Clean Catch       UROBILINOGEN UA     Negative       WBC, UA     4       WBC   6.65                 Significant Imaging: I have reviewed all pertinent imaging results/findings within the past 24 hours.  CT Abdomen Pelvis With IV Contrast NO Oral Contrast   Final Result      Fluid-filled colon with wall thickening concerning for colitis.      At the descending/sigmoid colon junction there is questionable shouldering of the fluid within the colon.  Consider correlation with colonoscopy once the patient's acute symptoms have resolved to better exclude underlying mass.      Complete findings as above.      All CT scans at this facility are performed  using dose modulation techniques as appropriate to performed exam including the following:  automated exposure control; adjustment of mA and/or kV according to the patients size (this includes techniques or standardized protocols for targeted exams where dose is matched to indication/reason for exam: i.e. extremities or head);  iterative reconstruction technique.         Electronically signed by: Armand Del Cid   Date:    07/02/2024   Time:    14:56         Assessment/Plan:     * Colitis  -patient presents with lower quadrant abdominal pain, intermittent nausea with vomiting, and persistent diarrhea  -CT scan of abdomen and pelvis with "fluid-filled colon with wall thickening concerning for colitis; at the descending/sigmoid colon junction there is questionable shouldering of the fluid within the colon, consider correlation with colonoscopy once the patient's acute symptoms have resolved to better exclude underlying mass."  -white blood cell count 6.65, LFTs unremarkable, lipase 16, urinalysis abnormal, " afebrile  -continue cardiac low-sodium diet as patient reports she is now tolerating a regular consistency diet with no further nausea and vomiting  -IV antibiotics with Cipro and Flagyl, IV fluids with LR at 75 mL/hr, p.r.n. pain/nausea control  -check stool studies to include stool culture, Giardia/Cryptosporidium, fecal white blood cell, occult blood, and C diff  - will also check influenza a/B and COVID-19 in the setting of nausea, vomiting, and diarrhea  -check TSH, free T4, lactic acid level, and ESR  -check KUB in a.m.      Diarrhea  See discussion for colitis      Hypokalemia  Patient has hypokalemia which is Acute and currently uncontrolled. Most recent potassium levels reviewed-   Lab Results   Component Value Date    K 2.9 (L) 07/02/2024   . Will continue potassium replacement per protocol and recheck repeat levels after replacement completed.   -potassium on admit 2.4 and currently 2.9, magnesium level 1.9, status post potassium chloride 10 mEq IV and potassium chloride 60 mEq p.o. given in the emergency department  -give additional potassium chloride 20 mEq IV, repeat potassium level now  -telemetry monitoring  -check a.m. labs    Hypertensive urgency  Patient has a current diagnosis of hypertensive urgency (without evidence of end organ damage) which is uncontrolled.  Latest blood pressure and vitals reviewed-   Temp:  [98.2 °F (36.8 °C)]   Pulse:  []   Resp:  [15-20]   BP: (180-209)/(81-95)   SpO2:  [95 %-100 %] .   Patient currently off IV antihypertensives.   Home meds for hypertension were reviewed and noted below.   Hypertension Medications               amLODIPine (NORVASC) 5 MG tablet Take 1 tablet (5 mg total) by mouth once daily.            Medication adjustment for hospital antihypertensives is as follows- start Norvasc 5 mg p.o. daily, 1st dose now.  Give hydralazine 10 mg IV x1 dose now.  P.r.n. IV hydralazine with parameters.  Additional antihypertensives as needed.    Will aim for  controlled BP reduction by medications noted above. Monitor and mitigate end organ damage as indicated.    Abnormal urinalysis  -patient only complains of urinary frequency and lower abdominal pain  -creatinine 1.0  -check urine culture  -empiric IV Cipro        VTE Risk Mitigation (From admission, onward)           Ordered     enoxaparin injection 40 mg  Daily         07/02/24 1933     IP VTE HIGH RISK PATIENT  Once         07/02/24 1933     Place sequential compression device  Until discontinued         07/02/24 1933                       On 07/02/2024, patient should be placed in hospital observation services under my care.             Rosa Hathaway MD  Department of Hospital Medicine  [facility] - Emergency Dept.

## 2024-07-03 NOTE — SUBJECTIVE & OBJECTIVE
Past Medical History:   Diagnosis Date    Hypertension        Past Surgical History:   Procedure Laterality Date    CHOLECYSTECTOMY         Review of patient's allergies indicates:  No Known Allergies    No current facility-administered medications on file prior to encounter.     Current Outpatient Medications on File Prior to Encounter   Medication Sig    amLODIPine (NORVASC) 5 MG tablet Take 1 tablet (5 mg total) by mouth once daily.     Family History    None       Tobacco Use    Smoking status: Never    Smokeless tobacco: Never   Substance and Sexual Activity    Alcohol use: Never    Drug use: Never    Sexual activity: Not on file     Review of Systems   Constitutional:  Negative for chills and fever.   Gastrointestinal:  Positive for abdominal pain, diarrhea, nausea and vomiting. Negative for blood in stool.   Genitourinary:  Positive for frequency. Negative for difficulty urinating, dysuria and hematuria.   All other systems reviewed and are negative.    Objective:     Vital Signs (Most Recent):  Temp: 98.2 °F (36.8 °C) (07/02/24 1231)  Pulse: 84 (07/02/24 1848)  Resp: 20 (07/02/24 1848)  BP: (!) 202/84 (07/02/24 1848)  SpO2: 98 % (07/02/24 1848) Vital Signs (24h Range):  Temp:  [98.2 °F (36.8 °C)] 98.2 °F (36.8 °C)  Pulse:  [] 84  Resp:  [15-20] 20  SpO2:  [95 %-100 %] 98 %  BP: (180-209)/(81-95) 202/84     Weight: 61.7 kg (136 lb 0.4 oz)  Body mass index is 21.95 kg/m².     Physical Exam  Vitals reviewed.   Constitutional:       General: She is not in acute distress.  HENT:      Nose: Nose normal.   Eyes:      Conjunctiva/sclera: Conjunctivae normal.   Cardiovascular:      Rate and Rhythm: Normal rate.   Pulmonary:      Effort: Pulmonary effort is normal. No respiratory distress.   Abdominal:      General: There is distension.      Tenderness: There is abdominal tenderness. There is no rebound.   Musculoskeletal:         General: No swelling.   Skin:     General: Skin is warm and dry.   Neurological:       Mental Status: She is alert and oriented to person, place, and time.   Psychiatric:         Mood and Affect: Mood normal.         Behavior: Behavior normal.                Significant Labs: All pertinent labs within the past 24 hours have been reviewed.  Recent Lab Results         07/02/24  1810   07/02/24  1316   07/02/24  1314        Albumin   3.5         ALP   125         ALT   14         Amorphous, UA     Occasional       Anion Gap 13   12         Appearance, UA     Hazy       AST   22         Bacteria, UA     Many       Baso #   0.05         Basophil %   0.8         Bilirubin (UA)     Negative       BILIRUBIN TOTAL   0.6  Comment: For infants and newborns, interpretation of results should be based  on gestational age, weight and in agreement with clinical  observations.    Premature Infant recommended reference ranges:  Up to 24 hours.............<8.0 mg/dL  Up to 48 hours............<12.0 mg/dL  3-5 days..................<15.0 mg/dL  6-29 days.................<15.0 mg/dL           BUN 12   14         Calcium 8.9   9.4         Chloride 100   98         CO2 23   26         Color, UA     Yellow       Creatinine 1.0   1.2         Differential Method   Automated         eGFR 57   46         Eos #   0.1         Eos %   1.5         Glucose 100   108         Glucose, UA     Negative       Gran # (ANC)   3.7         Gran %   56.0         Hematocrit   36.5         Hemoglobin   12.2         Immature Grans (Abs)   0.01  Comment: Mild elevation in immature granulocytes is non specific and   can be seen in a variety of conditions including stress response,   acute inflammation, trauma and pregnancy. Correlation with other   laboratory and clinical findings is essential.           Immature Granulocytes   0.2         Ketones, UA     Trace       Leukocyte Esterase, UA     2+       Lipase   16         Lymph #   1.9         Lymph %   29.2         Magnesium    1.9         MCH   30.5         MCHC   33.4         MCV   91          Microscopic Comment     SEE COMMENT  Comment: Other formed elements not mentioned in the report are not   present in the microscopic examination.          Mono #   0.8         Mono %   12.3         MPV   9.8         NITRITE UA     Negative       nRBC   0         Blood, UA     1+       pH, UA     7.0       Platelet Count   390         Potassium 2.9   2.4  Comment: POTASSIUM  critical result(s) called and verbal readback obtained   from jimmy beal rn by JUVENCIO 07/02/2024 13:52           PROTEIN TOTAL   8.3         Protein, UA     Negative  Comment: Recommend a 24 hour urine protein or a urine   protein/creatinine ratio if globulin induced proteinuria is  clinically suspected.         RBC   4.00         RBC, UA     3       RDW   13.1         Sodium 136   136         Spec Grav UA     1.020       Specimen UA     Urine, Clean Catch       UROBILINOGEN UA     Negative       WBC, UA     4       WBC   6.65                 Significant Imaging: I have reviewed all pertinent imaging results/findings within the past 24 hours.  CT Abdomen Pelvis With IV Contrast NO Oral Contrast   Final Result      Fluid-filled colon with wall thickening concerning for colitis.      At the descending/sigmoid colon junction there is questionable shouldering of the fluid within the colon.  Consider correlation with colonoscopy once the patient's acute symptoms have resolved to better exclude underlying mass.      Complete findings as above.      All CT scans at this facility are performed  using dose modulation techniques as appropriate to performed exam including the following:  automated exposure control; adjustment of mA and/or kV according to the patients size (this includes techniques or standardized protocols for targeted exams where dose is matched to indication/reason for exam: i.e. extremities or head);  iterative reconstruction technique.         Electronically signed by: Armand Del Cid   Date:    07/02/2024   Time:    14:56

## 2024-07-03 NOTE — ASSESSMENT & PLAN NOTE
"-patient presents with lower quadrant abdominal pain, intermittent nausea with vomiting, and persistent diarrhea  -CT scan of abdomen and pelvis with "fluid-filled colon with wall thickening concerning for colitis; at the descending/sigmoid colon junction there is questionable shouldering of the fluid within the colon, consider correlation with colonoscopy once the patient's acute symptoms have resolved to better exclude underlying mass."  -white blood cell count 6.65, LFTs unremarkable, lipase 16, urinalysis abnormal, afebrile  -continue cardiac low-sodium diet as patient reports she is now tolerating a regular consistency diet with no further nausea and vomiting  -IV antibiotics with Cipro and Flagyl, IV fluids with LR at 75 mL/hr, p.r.n. pain/nausea control  -check stool studies to include stool culture, Giardia/Cryptosporidium, fecal white blood cell, occult blood, and C diff  -check TSH, free T4, lactic acid level, and ESR  -check KUB in a.m.    "

## 2024-07-03 NOTE — PLAN OF CARE
[facility] - Telemetry (Hospital)  Initial Discharge Assessment       Primary Care Provider: No, Primary Doctor    Admission Diagnosis: Hypokalemia [E87.6]  Colitis [K52.9]  Chest pain [R07.9]  Hypertension, unspecified type [I10]    Admission Date: 7/2/2024  Expected Discharge Date: Per Attending    Transition of Care Barriers: None    Payor: BLUE CROSS BLUE [facility] / Plan: MEDICARE SUPPLEMENT BCBS OF [place] / Product Type: Medicare Supplement /     Extended Emergency Contact Information  Primary Emergency Contact: Autumn Mae  Mobile Phone: [phone]  Relation: Other  Preferred language: English   needed? No  Secondary Emergency Contact: Angelique Mae  Mobile Phone: [phone]  Relation: Relative    Discharge Plan A: Home with family         [facility] [place] - [id number]  [address]  [place] [address]  Phone: [phone] Fax: [phone]      Initial Assessment (most recent)       Adult Discharge Assessment - 07/03/24 1240          Discharge Assessment    Assessment Type Discharge Planning Assessment     Confirmed/corrected address, phone number and insurance Yes     Confirmed Demographics Correct on Facesheet     Source of Information patient     Communicated LUKASZ with patient/caregiver Date not available/Unable to determine     Reason For Admission Colitis     People in Home other relative(s)   Niece, Niece's , and son    Facility Arrived From: home     Do you expect to return to your current living situation? Yes     Do you have help at home or someone to help you manage your care at home? Yes     Who are your caregiver(s) and their phone number(s)? Autumn taylor     Prior to hospitilization cognitive status: Alert/Oriented     Current cognitive status: Alert/Oriented     Walking or Climbing Stairs Difficulty no     Dressing/Bathing Difficulty no     Home Accessibility wheelchair accessible     Equipment Currently Used at Home none     Readmission  within 30 days? No     Patient currently being followed by outpatient case management? No     Do you currently have service(s) that help you manage your care at home? No     Do you take prescription medications? Yes     Do you have prescription coverage? Yes     Coverage BCBS - Commercial     Do you have any problems affording any of your prescribed medications? No     Is the patient taking medications as prescribed? yes     Who is going to help you get home at discharge? Autumn taylor     How do you get to doctors appointments? family or friend will provide     Are you on dialysis? No     Do you take coumadin? No     Discharge Plan A Home with family     DME Needed Upon Discharge  none     Discharge Plan discussed with: Patient     Transition of Care Barriers None        Transportation Needs    Has the lack of transportation kept you from medical appointments, meetings, work or from getting things needed for daily living? No                   Anticipated DC dispo: home with family  Prior Level of Function: independent with ADLs  People in home: Angelique taylor, Niece's , and son    Comments:  SW met with patient at bedside to introduce role and discuss discharge planning. Patient's nieces will be help at home and can provide transport at time of discharge. Confirmed demographics, insurance, and emergency contacts. Patient lives with Great NieceAngelique, but was unsure of the physical address. SW updated Patient's whiteboard with contact information and anticipated DC plan. CM discharge needs depends on hospital progress. SW will continue following to assist with other needs.

## 2024-07-04 VITALS
OXYGEN SATURATION: 99 % | WEIGHT: 139.13 LBS | HEIGHT: 66 IN | BODY MASS INDEX: 22.36 KG/M2 | RESPIRATION RATE: 18 BRPM | DIASTOLIC BLOOD PRESSURE: 72 MMHG | SYSTOLIC BLOOD PRESSURE: 165 MMHG | HEART RATE: 97 BPM | TEMPERATURE: 98 F

## 2024-07-04 LAB
ANION GAP SERPL CALC-SCNC: 7 MMOL/L (ref 8–16)
BUN SERPL-MCNC: 14 MG/DL (ref 8–23)
CALCIUM SERPL-MCNC: 9.3 MG/DL (ref 8.7–10.5)
CHLORIDE SERPL-SCNC: 104 MMOL/L (ref 95–110)
CO2 SERPL-SCNC: 24 MMOL/L (ref 23–29)
CREAT SERPL-MCNC: 1.2 MG/DL (ref 0.5–1.4)
EST. GFR  (NO RACE VARIABLE): 46 ML/MIN/1.73 M^2
GLUCOSE SERPL-MCNC: 101 MG/DL (ref 70–110)
POTASSIUM SERPL-SCNC: 3 MMOL/L (ref 3.5–5.1)
SODIUM SERPL-SCNC: 135 MMOL/L (ref 136–145)

## 2024-07-04 PROCEDURE — 25000003 PHARM REV CODE 250: Performed by: FAMILY MEDICINE

## 2024-07-04 PROCEDURE — 63600175 PHARM REV CODE 636 W HCPCS: Performed by: FAMILY MEDICINE

## 2024-07-04 PROCEDURE — 63600175 PHARM REV CODE 636 W HCPCS: Mod: JZ,JG | Performed by: INTERNAL MEDICINE

## 2024-07-04 PROCEDURE — 80048 BASIC METABOLIC PNL TOTAL CA: CPT | Performed by: FAMILY MEDICINE

## 2024-07-04 PROCEDURE — 36415 COLL VENOUS BLD VENIPUNCTURE: CPT | Performed by: FAMILY MEDICINE

## 2024-07-04 PROCEDURE — 25000003 PHARM REV CODE 250: Performed by: INTERNAL MEDICINE

## 2024-07-04 RX ORDER — POTASSIUM CHLORIDE 20 MEQ/15ML
40 SOLUTION ORAL 2 TIMES DAILY
Qty: 180 ML | Refills: 0 | Status: SHIPPED | OUTPATIENT
Start: 2024-07-04 | End: 2024-07-11

## 2024-07-04 RX ORDER — METRONIDAZOLE 500 MG/1
500 TABLET ORAL 3 TIMES DAILY
Qty: 21 TABLET | Refills: 0 | Status: SHIPPED | OUTPATIENT
Start: 2024-07-04 | End: 2024-07-11

## 2024-07-04 RX ORDER — CIPROFLOXACIN 500 MG/1
500 TABLET ORAL 2 TIMES DAILY
Qty: 14 TABLET | Refills: 0 | Status: SHIPPED | OUTPATIENT
Start: 2024-07-04 | End: 2024-07-11

## 2024-07-04 RX ORDER — AMLODIPINE BESYLATE 10 MG/1
10 TABLET ORAL DAILY
Qty: 30 TABLET | Refills: 0 | Status: SHIPPED | OUTPATIENT
Start: 2024-07-04 | End: 2024-08-03

## 2024-07-04 RX ORDER — POTASSIUM CHLORIDE 7.45 MG/ML
10 INJECTION INTRAVENOUS ONCE
Status: COMPLETED | OUTPATIENT
Start: 2024-07-04 | End: 2024-07-04

## 2024-07-04 RX ADMIN — POTASSIUM CHLORIDE 40 MEQ: 1500 TABLET, EXTENDED RELEASE ORAL at 08:07

## 2024-07-04 RX ADMIN — CIPROFLOXACIN 400 MG: 2 INJECTION, SOLUTION INTRAVENOUS at 08:07

## 2024-07-04 RX ADMIN — AMLODIPINE BESYLATE 5 MG: 5 TABLET ORAL at 08:07

## 2024-07-04 RX ADMIN — POTASSIUM CHLORIDE 10 MEQ: 7.46 INJECTION, SOLUTION INTRAVENOUS at 10:07

## 2024-07-04 RX ADMIN — METRONIDAZOLE 500 MG: 500 INJECTION, SOLUTION INTRAVENOUS at 02:07

## 2024-07-04 NOTE — PLAN OF CARE
A222/A222 BAYLEE Miles is a 78 y.o.female admitted on 7/2/2024 for Colitis   Code Status: Full Code MRN: [id number]   Review of patient's allergies indicates:  No Known Allergies  Past Medical History:   Diagnosis Date    Hypertension       PRN meds    acetaminophen, 650 mg, Q6H PRN  dextrose 10%, 12.5 g, PRN  dextrose 10%, 25 g, PRN  glucagon (human recombinant), 1 mg, PRN  glucose, 16 g, PRN  glucose, 24 g, PRN  hydrALAZINE, 10 mg, Q6H PRN  morphine, 2 mg, Q6H PRN  naloxone, 0.02 mg, PRN  ondansetron, 4 mg, Q6H PRN  sodium chloride 0.9%, 10 mL, Q12H PRN      Chart check completed. Will continue plan of care.      Orientation: oriented x 4  Jerusalem Coma Scale Score: 15     Lead Monitored: Lead II Rhythm: normal sinus rhythm Frequency/Ectopy: PVCs  Cardiac/Telemetry Box Number: [id number]  VTE Required Core Measure: Pharmacological prophylaxis initiated/maintained Last Bowel Movement: 07/02/24  Diet Cardiac Low Sodium,2gm     Robert Score: 20  Fall Risk Score: 7  Accucheck []   Freq?      Lines/Drains/Airways       Peripheral Intravenous Line  Duration                  Peripheral IV - Single Lumen 07/03/24 1830 Distal;Posterior;Right Forearm <1 day

## 2024-07-04 NOTE — ASSESSMENT & PLAN NOTE
Patient has a current diagnosis of hypertensive urgency (without evidence of end organ damage) which is uncontrolled.  Latest blood pressure and vitals reviewed-   Temp:  [97.8 °F (36.6 °C)-98.4 °F (36.9 °C)]   Pulse:  [79-99]   Resp:  [15-20]   BP: (144-171)/(65-85)   SpO2:  [97 %-99 %] .   Patient currently off IV antihypertensives.   Home meds for hypertension were reviewed and noted below.   Hypertension Medications               amLODIPine (NORVASC) 5 MG tablet Take 1 tablet (5 mg total) by mouth once daily.            Medication adjustment for hospital antihypertensives is as follows- start Norvasc 5 mg p.o. daily, 1st dose now.  Give hydralazine 10 mg IV x1 dose now.  P.r.n. IV hydralazine with parameters.  Additional antihypertensives as needed.    Will aim for controlled BP reduction by medications noted above. Monitor and mitigate end organ damage as indicated.  -amlodipine increased to 10 mg p.o. daily upon discharge

## 2024-07-04 NOTE — ASSESSMENT & PLAN NOTE
"-patient presents with lower quadrant abdominal pain, intermittent nausea with vomiting, and persistent diarrhea  -CT scan of abdomen and pelvis with "fluid-filled colon with wall thickening concerning for colitis; at the descending/sigmoid colon junction there is questionable shouldering of the fluid within the colon, consider correlation with colonoscopy once the patient's acute symptoms have resolved to better exclude underlying mass."  -white blood cell count 6.65, LFTs unremarkable, lipase 16, urinalysis abnormal, afebrile  -continue cardiac low-sodium diet as patient reports she is now tolerating a regular consistency diet with no further nausea and vomiting  -IV antibiotics with Cipro and Flagyl, IV fluids with LR at 75 mL/hr, p.r.n. pain/nausea control were given during hospitalization.  Patient was discharged on additional 7 days of Cipro and Flagyl oral.  As well as p.o. potassium for 3 days  -check stool studies to include Giardia/Cryptosporidium pending, occult blood negative, and C diff negative  -check TSH, free T4, lactic acid level, and ESR-reviewed  -check KUB in a.m. -Nonobstructive small bowel gas pattern.     "

## 2024-07-04 NOTE — ASSESSMENT & PLAN NOTE
Patient has hypokalemia which is Acute and currently uncontrolled. Most recent potassium levels reviewed-   Lab Results   Component Value Date    K 3.0 (L) 07/04/2024   . Will continue potassium replacement per protocol and recheck repeat levels after replacement completed.   -potassium on admit 2.4 and currently 2.9, magnesium level 1.9, status post potassium chloride 10 mEq IV and potassium chloride 60 mEq p.o. given in the emergency department  -give additional potassium chloride 20 mEq IV, continue p.o. potassium b.i.d. x3 days and discharged on 3 days of p.o. potassium as patient's diarrhea is resolving but has not yet resolved

## 2024-07-04 NOTE — DISCHARGE SUMMARY
[facility] Medicine  Discharge Summary      Patient Name: Isabel Miles  MRN: [id number]  GAGANDEEP: [id number]  Patient Class: IP- Inpatient  Admission Date: 7/2/2024  Hospital Length of Stay: 1 days  Discharge Date and Time: 7/4/2024  2:44 PM  Attending Physician: Lynn att. providers found   Discharging Provider: Thao Au MD  Primary Care Provider: Lynn Primary Doctor    Primary Care Team: Networked reference to record PCT     HPI:   78-year-old white woman with history of hypertension who presents to the emergency department with complaint of abdominal pain over the last 2-3 weeks after eating red beans and rice.  Abdominal pain is localized to the lower abdomen, intermittent, 10/10 on the pain scale at its worst and currently improved after IV morphine administration in the emergency department.  No aggravating or alleviating factors identified.  Patient has had several episodes of nausea with vomiting.  She has had diarrhea over the last 2-3 weeks, frequent episodes, moderate-to-large volume, brown and nonbloody.  She denies any associated fevers or chills.  She denies any dysuria or hematuria but has had urinary frequency.  Patient is currently tolerating a regular diet by her account.    * No surgery found *      Hospital Course:   Patient admitted with infectious colitis.  She continues to have diarrhea (6-7 BM since admit).  Stool studies pending.  Hypokalemia noted and PO potassium started.  Given her ongoing diarrhea her potassium will be closely monitored.  She does state that her abdominal pain has improved since being admitted.  She also denies any additional nausea and vomiting and patient has been started on a low-sodium diet.  In the last 24 hours patient's niece at bedside stated that she only had 2-3 bowel movements patient states that they are now more formed.  Stool studies were negative with the exception of giardia which is pending.  Patient has been sent out on  Cipro and Flagyl x7 days.  She has also been counseled on following up with her PCP.  Patient's blood pressure was noted to be elevated during hospitalization and her Norvasc was increased to 10 daily.  Plan of care was discussed with patient and niece at bedside.     Goals of Care Treatment Preferences:  Code Status: Full Code      Consults:     Cardiac/Vascular  Hypertensive urgency  Patient has a current diagnosis of hypertensive urgency (without evidence of end organ damage) which is uncontrolled.  Latest blood pressure and vitals reviewed-   Temp:  [97.8 °F (36.6 °C)-98.4 °F (36.9 °C)]   Pulse:  [79-99]   Resp:  [15-20]   BP: (144-171)/(65-85)   SpO2:  [97 %-99 %] .   Patient currently off IV antihypertensives.   Home meds for hypertension were reviewed and noted below.   Hypertension Medications               amLODIPine (NORVASC) 5 MG tablet Take 1 tablet (5 mg total) by mouth once daily.            Medication adjustment for hospital antihypertensives is as follows- start Norvasc 5 mg p.o. daily, 1st dose now.  Give hydralazine 10 mg IV x1 dose now.  P.r.n. IV hydralazine with parameters.  Additional antihypertensives as needed.    Will aim for controlled BP reduction by medications noted above. Monitor and mitigate end organ damage as indicated.  -amlodipine increased to 10 mg p.o. daily upon discharge    Renal/  Hypokalemia  Patient has hypokalemia which is Acute and currently uncontrolled. Most recent potassium levels reviewed-   Lab Results   Component Value Date    K 3.0 (L) 07/04/2024   . Will continue potassium replacement per protocol and recheck repeat levels after replacement completed.   -potassium on admit 2.4 and currently 2.9, magnesium level 1.9, status post potassium chloride 10 mEq IV and potassium chloride 60 mEq p.o. given in the emergency department  -give additional potassium chloride 20 mEq IV, continue p.o. potassium b.i.d. x3 days and discharged on 3 days of p.o. potassium as  "patient's diarrhea is resolving but has not yet resolved      GI  * Colitis  -patient presents with lower quadrant abdominal pain, intermittent nausea with vomiting, and persistent diarrhea  -CT scan of abdomen and pelvis with "fluid-filled colon with wall thickening concerning for colitis; at the descending/sigmoid colon junction there is questionable shouldering of the fluid within the colon, consider correlation with colonoscopy once the patient's acute symptoms have resolved to better exclude underlying mass."  -white blood cell count 6.65, LFTs unremarkable, lipase 16, urinalysis abnormal, afebrile  -continue cardiac low-sodium diet as patient reports she is now tolerating a regular consistency diet with no further nausea and vomiting  -IV antibiotics with Cipro and Flagyl, IV fluids with LR at 75 mL/hr, p.r.n. pain/nausea control were given during hospitalization.  Patient was discharged on additional 7 days of Cipro and Flagyl oral.  As well as p.o. potassium for 3 days  -check stool studies to include Giardia/Cryptosporidium pending, occult blood negative, and C diff negative  -check TSH, free T4, lactic acid level, and ESR-reviewed  -check KUB in a.m. -Nonobstructive small bowel gas pattern.         Final Active Diagnoses:    Diagnosis Date Noted POA    PRINCIPAL PROBLEM:  Colitis [K52.9] 07/02/2024 Yes    Diarrhea [R19.7] 07/02/2024 Yes    Hypokalemia [E87.6] 07/02/2024 Yes    Hypertensive urgency [I16.0] 07/02/2024 Yes    Abnormal urinalysis [R82.90] 07/02/2024 Yes      Problems Resolved During this Admission:       Discharged Condition: stable    Disposition: Home or Self Care    Follow Up:   Follow-up Information       Moose Kruse NP Follow up in 1 week(s).    Specialty: Family Medicine  Contact information:  [address] [place] [address] [phone]                           Patient Instructions:      Diet Adult Regular     Activity as tolerated       Significant Diagnostic " "Studies: Labs: BMP:   Recent Labs   Lab 07/02/24  2219 07/03/24  0425 07/04/24  0518   GLU  --  102 101   NA  --  137 135*   K 2.8* 2.8* 3.0*   CL  --  102 104   CO2  --  21* 24   BUN  --  12 14   CREATININE  --  1.1 1.2   CALCIUM  --  9.1 9.3   MG  --  1.8  --     and CBC   Recent Labs   Lab 07/03/24  0425   WBC 6.79   HGB 11.7*   HCT 36.6*        Microbiology: Blood Culture No results found for: "LABBLOO" and Stool cultures  Radiology:   X-Ray Abdomen Flat And Erect   Final Result      Nonobstructive small bowel gas pattern.         Electronically signed by: Armand Del Cid   Date:    07/03/2024   Time:    08:19      CT Abdomen Pelvis With IV Contrast NO Oral Contrast   Final Result      Fluid-filled colon with wall thickening concerning for colitis.      At the descending/sigmoid colon junction there is questionable shouldering of the fluid within the colon.  Consider correlation with colonoscopy once the patient's acute symptoms have resolved to better exclude underlying mass.      Complete findings as above.      All CT scans at this facility are performed  using dose modulation techniques as appropriate to performed exam including the following:  automated exposure control; adjustment of mA and/or kV according to the patients size (this includes techniques or standardized protocols for targeted exams where dose is matched to indication/reason for exam: i.e. extremities or head);  iterative reconstruction technique.         Electronically signed by: Armand Del Cid   Date:    07/02/2024   Time:    14:56           Pending Diagnostic Studies:       Procedure Component Value Units Date/Time    Giardia / Cryptosporidum, EIA [[id number]] Collected: 07/03/24 0758    Order Status: Sent Lab Status: In process Updated: 07/04/24 0035    Specimen: Stool            Medications:  Reconciled Home Medications:      Medication List        START taking these medications      ciprofloxacin HCl 500 MG tablet  Commonly known " as: CIPRO  Take 1 tablet (500 mg total) by mouth 2 (two) times daily. for 7 days     metroNIDAZOLE 500 MG tablet  Commonly known as: FLAGYL  Take 1 tablet (500 mg total) by mouth 3 (three) times daily. for 7 days     potassium chloride 10% 20 mEq/15 mL oral solution  Commonly known as: KAYCIEL  Take 30 mLs (40 mEq total) by mouth 2 (two) times daily. for 3 days            CHANGE how you take these medications      amLODIPine 10 MG tablet  Commonly known as: NORVASC  Take 1 tablet (10 mg total) by mouth once daily.  What changed:   medication strength  how much to take              Indwelling Lines/Drains at time of discharge:   Lines/Drains/Airways       None                   Time spent on the discharge of patient: 40 minutes         Thao Au MD  Department of Hospital Medicine  '[facility] - Telemetry ([facility])

## 2024-07-04 NOTE — PLAN OF CARE
[facility] - Telemetry (Hospital)  Discharge Final Note    Primary Care Provider: No, Primary Doctor    Expected Discharge Date: 7/4/2024    Final Discharge Note (most recent)       Final Note - 07/04/24 1318          Final Note    Assessment Type Final Discharge Note     Anticipated Discharge Disposition Home or Self Care     Hospital Resources/Appts/Education Provided Appointments scheduled and added to AVS        Post-Acute Status    Discharge Delays None known at this time                     Important Message from Medicare             Contact Info       Moose Kruse NP   Specialty: Family Medicine    [address] [place] [id number]   Phone: [phone]       Next Steps: Follow up in 1 week(s)          PCP follow up scheduled:  Hospital Follow Up with Moose Kruse NP  Thursday Jul 11, 2024 8:40 AM  No other discharge planning needs noted.

## 2024-07-05 ENCOUNTER — HOSPITAL ENCOUNTER (INPATIENT)
Facility: HOSPITAL | Age: 79
LOS: 1 days | Discharge: HOME | DRG: 305 | End: 2024-07-06
Attending: EMERGENCY MEDICINE | Admitting: HOSPITALIST
Payer: MEDICARE

## 2024-07-05 ENCOUNTER — OFFICE VISIT (OUTPATIENT)
Dept: URGENT CARE | Facility: CLINIC | Age: 79
End: 2024-07-05
Payer: MEDICARE

## 2024-07-05 VITALS
TEMPERATURE: 98 F | OXYGEN SATURATION: 99 % | BODY MASS INDEX: 22.02 KG/M2 | DIASTOLIC BLOOD PRESSURE: 88 MMHG | SYSTOLIC BLOOD PRESSURE: 193 MMHG | RESPIRATION RATE: 18 BRPM | HEART RATE: 96 BPM | HEIGHT: 66 IN | WEIGHT: 137 LBS

## 2024-07-05 DIAGNOSIS — R03.0 ELEVATED BLOOD PRESSURE READING: ICD-10-CM

## 2024-07-05 DIAGNOSIS — I16.0 HYPERTENSIVE URGENCY: Primary | ICD-10-CM

## 2024-07-05 DIAGNOSIS — I21.4 NSTEMI (NON-ST ELEVATED MYOCARDIAL INFARCTION): ICD-10-CM

## 2024-07-05 DIAGNOSIS — T18.128A OBSTRUCTION OF ESOPHAGUS DUE TO FOOD IMPACTION: Primary | ICD-10-CM

## 2024-07-05 DIAGNOSIS — R19.7 DIARRHEA, UNSPECIFIED TYPE: ICD-10-CM
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
## 2024-07-05 DIAGNOSIS — R07.9 CHEST PAIN: ICD-10-CM

## 2024-07-05 DIAGNOSIS — R13.19 OTHER DYSPHAGIA: ICD-10-CM

## 2024-07-05 DIAGNOSIS — K56.699 FOOD BOLUS OBSTRUCTION OF INTESTINE: ICD-10-CM

## 2024-07-05 DIAGNOSIS — R07.9 CHEST PAIN, UNSPECIFIED TYPE: ICD-10-CM

## 2024-07-05 DIAGNOSIS — R11.10 VOMITING IN ADULT: ICD-10-CM

## 2024-07-05 DIAGNOSIS — N17.9 AKI (ACUTE KIDNEY INJURY): ICD-10-CM

## 2024-07-05 DIAGNOSIS — E83.42 HYPOMAGNESEMIA: ICD-10-CM

## 2024-07-05 DIAGNOSIS — E87.6 HYPOKALEMIA: ICD-10-CM

## 2024-07-05 DIAGNOSIS — K52.9 COLITIS: ICD-10-CM
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
## 2024-07-05 DIAGNOSIS — K21.9 GASTROESOPHAGEAL REFLUX DISEASE, UNSPECIFIED WHETHER ESOPHAGITIS PRESENT: ICD-10-CM

## 2024-07-05 DIAGNOSIS — I16.1 HYPERTENSIVE EMERGENCY: ICD-10-CM

## 2024-07-05 LAB
ALBUMIN SERPL BCP-MCNC: 3.5 G/DL (ref 3.5–5.2)
ALP SERPL-CCNC: 107 U/L (ref 55–135)
ALT SERPL W/O P-5'-P-CCNC: 15 U/L (ref 10–44)
ANION GAP SERPL CALC-SCNC: 10 MMOL/L (ref 8–16)
AST SERPL-CCNC: 31 U/L (ref 10–40)
BACTERIA STL CULT: NORMAL
BACTERIA STL CULT: NORMAL
BASOPHILS # BLD AUTO: 0.05 K/UL (ref 0–0.2)
BASOPHILS NFR BLD: 0.5 % (ref 0–1.9)
BILIRUB SERPL-MCNC: 0.5 MG/DL (ref 0.1–1)
BUN SERPL-MCNC: 13 MG/DL (ref 8–23)
CALCIUM SERPL-MCNC: 9.2 MG/DL (ref 8.7–10.5)
CHLORIDE SERPL-SCNC: 108 MMOL/L (ref 95–110)
CO2 SERPL-SCNC: 19 MMOL/L (ref 23–29)
CREAT SERPL-MCNC: 1.5 MG/DL (ref 0.5–1.4)
CRYPTOSP AG STL QL IA: NEGATIVE
DIFFERENTIAL METHOD BLD: ABNORMAL
EOSINOPHIL # BLD AUTO: 0.1 K/UL (ref 0–0.5)
EOSINOPHIL NFR BLD: 0.8 % (ref 0–8)
ERYTHROCYTE [DISTWIDTH] IN BLOOD BY AUTOMATED COUNT: 13.4 % (ref 11.5–14.5)
EST. GFR  (NO RACE VARIABLE): 35 ML/MIN/1.73 M^2
G LAMBLIA AG STL QL IA: NEGATIVE
GLUCOSE SERPL-MCNC: 117 MG/DL (ref 70–110)
HCT VFR BLD AUTO: 35.7 % (ref 37–48.5)
HGB BLD-MCNC: 11.6 G/DL (ref 12–16)
IMM GRANULOCYTES # BLD AUTO: 0.03 K/UL (ref 0–0.04)
IMM GRANULOCYTES NFR BLD AUTO: 0.3 % (ref 0–0.5)
LIPASE SERPL-CCNC: 26 U/L (ref 4–60)
LYMPHOCYTES # BLD AUTO: 1.3 K/UL (ref 1–4.8)
LYMPHOCYTES NFR BLD: 11.6 % (ref 18–48)
MAGNESIUM SERPL-MCNC: 1.5 MG/DL (ref 1.6–2.6)
MCH RBC QN AUTO: 30.1 PG (ref 27–31)
MCHC RBC AUTO-ENTMCNC: 32.5 G/DL (ref 32–36)
MCV RBC AUTO: 93 FL (ref 82–98)
MONOCYTES # BLD AUTO: 0.9 K/UL (ref 0.3–1)
MONOCYTES NFR BLD: 7.9 % (ref 4–15)
NEUTROPHILS # BLD AUTO: 8.8 K/UL (ref 1.8–7.7)
NEUTROPHILS NFR BLD: 78.9 % (ref 38–73)
NRBC BLD-RTO: 0 /100 WBC
PLATELET # BLD AUTO: 368 K/UL (ref 150–450)
PMV BLD AUTO: 9.7 FL (ref 9.2–12.9)
POTASSIUM SERPL-SCNC: 2.8 MMOL/L (ref 3.5–5.1)
PROT SERPL-MCNC: 8 G/DL (ref 6–8.4)
RBC # BLD AUTO: 3.85 M/UL (ref 4–5.4)
SODIUM SERPL-SCNC: 137 MMOL/L (ref 136–145)
TROPONIN I SERPL DL<=0.01 NG/ML-MCNC: 0.05 NG/ML (ref 0–0.03)
TROPONIN I SERPL DL<=0.01 NG/ML-MCNC: 0.05 NG/ML (ref 0–0.03)
WBC # BLD AUTO: 11.09 K/UL (ref 3.9–12.7)

## 2024-07-05 PROCEDURE — 25000003 PHARM REV CODE 250: Performed by: EMERGENCY MEDICINE

## 2024-07-05 PROCEDURE — 63600175 PHARM REV CODE 636 W HCPCS: Performed by: EMERGENCY MEDICINE

## 2024-07-05 PROCEDURE — 25000003 PHARM REV CODE 250: Performed by: NURSE PRACTITIONER

## 2024-07-05 PROCEDURE — 99291 CRITICAL CARE FIRST HOUR: CPT

## 2024-07-05 PROCEDURE — 63600175 PHARM REV CODE 636 W HCPCS: Performed by: NURSE PRACTITIONER

## 2024-07-05 PROCEDURE — 84484 ASSAY OF TROPONIN QUANT: CPT | Performed by: HOSPITALIST

## 2024-07-05 PROCEDURE — 96375 TX/PRO/DX INJ NEW DRUG ADDON: CPT

## 2024-07-05 PROCEDURE — 96374 THER/PROPH/DIAG INJ IV PUSH: CPT

## 2024-07-05 PROCEDURE — 93010 ELECTROCARDIOGRAM REPORT: CPT | Mod: ,,, | Performed by: INTERNAL MEDICINE

## 2024-07-05 PROCEDURE — 83735 ASSAY OF MAGNESIUM: CPT | Performed by: EMERGENCY MEDICINE

## 2024-07-05 PROCEDURE — 80053 COMPREHEN METABOLIC PANEL: CPT | Performed by: EMERGENCY MEDICINE

## 2024-07-05 PROCEDURE — 11000001 HC ACUTE MED/SURG PRIVATE ROOM

## 2024-07-05 PROCEDURE — 93005 ELECTROCARDIOGRAM TRACING: CPT

## 2024-07-05 PROCEDURE — 84484 ASSAY OF TROPONIN QUANT: CPT | Mod: 91 | Performed by: EMERGENCY MEDICINE

## 2024-07-05 PROCEDURE — 83690 ASSAY OF LIPASE: CPT | Performed by: EMERGENCY MEDICINE

## 2024-07-05 PROCEDURE — 85025 COMPLETE CBC W/AUTO DIFF WBC: CPT | Performed by: EMERGENCY MEDICINE

## 2024-07-05 RX ORDER — ACETAMINOPHEN 325 MG/1
650 TABLET ORAL
Status: DISCONTINUED | OUTPATIENT
Start: 2024-07-05 | End: 2024-07-05

## 2024-07-05 RX ORDER — LANOLIN ALCOHOL/MO/W.PET/CERES
800 CREAM (GRAM) TOPICAL
Status: DISCONTINUED | OUTPATIENT
Start: 2024-07-05 | End: 2024-07-06 | Stop reason: HOSPADM

## 2024-07-05 RX ORDER — ACETAMINOPHEN 325 MG/1
650 TABLET ORAL EVERY 8 HOURS PRN
Status: DISCONTINUED | OUTPATIENT
Start: 2024-07-05 | End: 2024-07-06 | Stop reason: HOSPADM

## 2024-07-05 RX ORDER — CIPROFLOXACIN 750 MG/1
750 TABLET, FILM COATED ORAL EVERY 24 HOURS
Status: DISCONTINUED | OUTPATIENT
Start: 2024-07-06 | End: 2024-07-06

## 2024-07-05 RX ORDER — METRONIDAZOLE 500 MG/1
500 TABLET ORAL 3 TIMES DAILY
Status: DISCONTINUED | OUTPATIENT
Start: 2024-07-05 | End: 2024-07-06 | Stop reason: HOSPADM

## 2024-07-05 RX ORDER — LABETALOL HYDROCHLORIDE 5 MG/ML
10 INJECTION, SOLUTION INTRAVENOUS
Status: COMPLETED | OUTPATIENT
Start: 2024-07-05 | End: 2024-07-05

## 2024-07-05 RX ORDER — MAGNESIUM SULFATE HEPTAHYDRATE 40 MG/ML
2 INJECTION, SOLUTION INTRAVENOUS
Status: COMPLETED | OUTPATIENT
Start: 2024-07-05 | End: 2024-07-05

## 2024-07-05 RX ORDER — NALOXONE HCL 0.4 MG/ML
0.02 VIAL (ML) INJECTION
Status: DISCONTINUED | OUTPATIENT
Start: 2024-07-05 | End: 2024-07-06 | Stop reason: HOSPADM

## 2024-07-05 RX ORDER — ONDANSETRON HYDROCHLORIDE 2 MG/ML
4 INJECTION, SOLUTION INTRAVENOUS EVERY 8 HOURS PRN
Status: DISCONTINUED | OUTPATIENT
Start: 2024-07-05 | End: 2024-07-06 | Stop reason: HOSPADM

## 2024-07-05 RX ORDER — GLUCAGON 1 MG
1 KIT INJECTION
Status: COMPLETED | OUTPATIENT
Start: 2024-07-05 | End: 2024-07-05

## 2024-07-05 RX ORDER — ONDANSETRON HYDROCHLORIDE 2 MG/ML
4 INJECTION, SOLUTION INTRAVENOUS
Status: COMPLETED | OUTPATIENT
Start: 2024-07-05 | End: 2024-07-05

## 2024-07-05 RX ORDER — ENOXAPARIN SODIUM 100 MG/ML
40 INJECTION SUBCUTANEOUS EVERY 24 HOURS
Status: DISCONTINUED | OUTPATIENT
Start: 2024-07-05 | End: 2024-07-05

## 2024-07-05 RX ORDER — SIMETHICONE 80 MG
1 TABLET,CHEWABLE ORAL 4 TIMES DAILY PRN
Status: DISCONTINUED | OUTPATIENT
Start: 2024-07-05 | End: 2024-07-06 | Stop reason: HOSPADM

## 2024-07-05 RX ORDER — POTASSIUM CHLORIDE 7.45 MG/ML
10 INJECTION INTRAVENOUS
Status: COMPLETED | OUTPATIENT
Start: 2024-07-05 | End: 2024-07-05

## 2024-07-05 RX ORDER — PANTOPRAZOLE SODIUM 40 MG/10ML
40 INJECTION, POWDER, LYOPHILIZED, FOR SOLUTION INTRAVENOUS DAILY
Status: DISCONTINUED | OUTPATIENT
Start: 2024-07-05 | End: 2024-07-06 | Stop reason: HOSPADM

## 2024-07-05 RX ORDER — PROMETHAZINE HYDROCHLORIDE 25 MG/1
25 SUPPOSITORY RECTAL EVERY 6 HOURS PRN
Status: DISCONTINUED | OUTPATIENT
Start: 2024-07-05 | End: 2024-07-06 | Stop reason: HOSPADM

## 2024-07-05 RX ORDER — ENOXAPARIN SODIUM 100 MG/ML
30 INJECTION SUBCUTANEOUS EVERY 24 HOURS
Status: DISCONTINUED | OUTPATIENT
Start: 2024-07-06 | End: 2024-07-06

## 2024-07-05 RX ORDER — MORPHINE SULFATE 4 MG/ML
4 INJECTION, SOLUTION INTRAMUSCULAR; INTRAVENOUS
Status: COMPLETED | OUTPATIENT
Start: 2024-07-05 | End: 2024-07-05

## 2024-07-05 RX ORDER — POTASSIUM CHLORIDE 7.45 MG/ML
10 INJECTION INTRAVENOUS
Status: DISCONTINUED | OUTPATIENT
Start: 2024-07-05 | End: 2024-07-05

## 2024-07-05 RX ORDER — AMLODIPINE BESYLATE 10 MG/1
10 TABLET ORAL DAILY
Status: DISCONTINUED | OUTPATIENT
Start: 2024-07-06 | End: 2024-07-06 | Stop reason: HOSPADM

## 2024-07-05 RX ORDER — HYDRALAZINE HYDROCHLORIDE 20 MG/ML
10 INJECTION INTRAMUSCULAR; INTRAVENOUS EVERY 6 HOURS PRN
Status: DISCONTINUED | OUTPATIENT
Start: 2024-07-05 | End: 2024-07-06 | Stop reason: HOSPADM

## 2024-07-05 RX ORDER — SODIUM CHLORIDE 0.9 % (FLUSH) 0.9 %
10 SYRINGE (ML) INJECTION EVERY 12 HOURS PRN
Status: DISCONTINUED | OUTPATIENT
Start: 2024-07-05 | End: 2024-07-06 | Stop reason: HOSPADM

## 2024-07-05 RX ORDER — HYDRALAZINE HYDROCHLORIDE 20 MG/ML
10 INJECTION INTRAMUSCULAR; INTRAVENOUS
Status: DISCONTINUED | OUTPATIENT
Start: 2024-07-05 | End: 2024-07-05

## 2024-07-05 RX ADMIN — PANTOPRAZOLE SODIUM 40 MG: 40 INJECTION, POWDER, FOR SOLUTION INTRAVENOUS at 05:07

## 2024-07-05 RX ADMIN — ENOXAPARIN SODIUM 40 MG: 40 INJECTION SUBCUTANEOUS at 05:07

## 2024-07-05 RX ADMIN — METRONIDAZOLE 500 MG: 500 TABLET ORAL at 08:07

## 2024-07-05 RX ADMIN — HYDRALAZINE HYDROCHLORIDE 10 MG: 20 INJECTION, SOLUTION INTRAMUSCULAR; INTRAVENOUS at 06:07

## 2024-07-05 RX ADMIN — GLUCAGON 1 MG: 1 INJECTION, POWDER, LYOPHILIZED, FOR SOLUTION INTRAMUSCULAR; INTRAVENOUS at 01:07

## 2024-07-05 RX ADMIN — POTASSIUM BICARBONATE 50 MEQ: 978 TABLET, EFFERVESCENT ORAL at 02:07

## 2024-07-05 RX ADMIN — POTASSIUM CHLORIDE 10 MEQ: 7.46 INJECTION, SOLUTION INTRAVENOUS at 03:07

## 2024-07-05 RX ADMIN — MAGNESIUM SULFATE HEPTAHYDRATE 2 G: 40 INJECTION, SOLUTION INTRAVENOUS at 03:07

## 2024-07-05 RX ADMIN — MORPHINE SULFATE 4 MG: 4 INJECTION INTRAVENOUS at 03:07

## 2024-07-05 RX ADMIN — ONDANSETRON 4 MG: 2 INJECTION INTRAMUSCULAR; INTRAVENOUS at 03:07

## 2024-07-05 RX ADMIN — LABETALOL HYDROCHLORIDE 10 MG: 5 INJECTION INTRAVENOUS at 01:07

## 2024-07-05 RX ADMIN — NITROGLYCERIN 1 INCH: 20 OINTMENT TOPICAL at 02:07

## 2024-07-05 NOTE — ASSESSMENT & PLAN NOTE
Patient has Abnormal Magnesium: hypomagnesemia. Will continue to monitor electrolytes closely. Will replace the affected electrolytes and repeat labs to be done after interventions completed. The patient's magnesium results have been reviewed and are listed below.  Recent Labs   Lab 07/05/24  1322   MG 1.5*      -Mg rider 2 gm in ER  -repeat Mg level in am, replete if needed

## 2024-07-05 NOTE — ASSESSMENT & PLAN NOTE
-stool is not as loose and has firmed up per patient, she no longer has abdominal pain, but has some tenderness to palpation  -cont Cipro and Flagyl PO   -C-diff negative on prior admission  -elevated inflammatory markers

## 2024-07-05 NOTE — ASSESSMENT & PLAN NOTE
Patient has hypokalemia which is Acute on Chronic and currently uncontrolled. Most recent potassium levels reviewed-   Lab Results   Component Value Date    K 2.8 (L) 07/05/2024   . Will continue potassium replacement per protocol and recheck repeat levels after replacement completed.   Given K-rider 10 mEq in ER, oral potassium attempted in ER, patient cannot finish it says it makes her chest hurt

## 2024-07-05 NOTE — PATIENT INSTRUCTIONS
Upon discharge, please go to the emergency room for further workup and management of suspected food bolus.    If you have been discharged from the clinic prior to your point of care test results being completed, please make sure to check your [facility] account.  If there is a change in treatment, we will communicate with you through here.  If your test is positive, and medications are ordered, these will be sent to your preferred pharmacy.   If your test is negative, no further steps needed. If you do not hear from us or have questions, please call the clinic.      - You must understand that you have received an Urgent Care treatment only and that you may be released before all of your medical problems are known or treated.   - You, the patient, will arrange for follow up care as instructed with your primary care provider or recommended specialist.   - If your condition worsens or fails to improve we recommend that you receive another evaluation at the ER immediately or contact your PCP to discuss your concerns, or return here.   - Please do not drive or make any important decisions for 24 hours if you have received any pain medications, sedatives or mood altering drugs during your visit.    Disclaimer: This document was drafted with the use of a voice recognition device and is likely to have sound alike errors.

## 2024-07-05 NOTE — ED PROVIDER NOTES
"SCRIBE #1 NOTE: I, Radu Davis, am scribing for, and in the presence of, Bridgett Salguero DO. I have scribed the entire note.       History     Chief Complaint   Patient presents with    food bolus     Pt presents to the ER with c/o a possible food bolus after eating grits and bread this morning. Pt c/o intermittent throat and chest pains when "the food comes up and tries to go back down". Pt denies any CP or SOB. No previous hx of food boluses. Pt speaking in full sentences. NAD. Respirations are even an unlabored.     Review of patient's allergies indicates:  No Known Allergies      History of Present Illness     HPI    7/5/2024, 1:24 PM  History obtained from the patient      History of Present Illness: Isabel Miles is a 78 y.o. female patient with a PMHx of HTN who presents to the Emergency Department for evaluation of food bolus which onset gradually this morning after eating grits and bread. Pt was unable to take any of her medications this morning. Pt reports vomiting when trying to drink water. Pt was able to have a normal bowel movement. Symptoms are constant and moderate in severity. No mitigating or exacerbating factors reported. Associated sxs include chest pain, abd pain, and vomiting. Patient denies any nausea, SOB, and all other sxs at this time. No prior Tx. No further complaints or concerns at this time.       Arrival mode: Personal vehicle      PCP: Lynn, Primary Doctor        Past Medical History:  Past Medical History:   Diagnosis Date    Hypertension        Past Surgical History:  Past Surgical History:   Procedure Laterality Date    CHOLECYSTECTOMY           Family History:  No family history on file.    Social History:  Social History     Tobacco Use    Smoking status: Never     Passive exposure: Current (niece smokes outside)    Smokeless tobacco: Never   Substance and Sexual Activity    Alcohol use: Never    Drug use: Never    Sexual activity: Not on file        Review of Systems "     Review of Systems   Respiratory:  Negative for shortness of breath.    Cardiovascular:  Positive for chest pain.   Gastrointestinal:  Positive for abdominal pain and vomiting. Negative for nausea.      Physical Exam     Initial Vitals [07/05/24 1222]   BP Pulse Resp Temp SpO2   (!) 180/83 85 18 100.3 °F (37.9 °C) 98 %      MAP       --          Physical Exam  Nursing Notes and Vital Signs Reviewed.  Constitutional: Patient is in no acute distress. Well-developed and well-nourished. Elderly and frail appearing.   Head: Atraumatic. Normocephalic.  Eyes: PERRL. EOM intact. Conjunctivae are not pale. No scleral icterus.  ENT: Mucous membranes are moist. Oropharynx is clear and symmetric.  Maintaining oral secretion, no drooling.   Neck: Supple. Full ROM. No lymphadenopathy.  Cardiovascular: Regular rate. Regular rhythm. No murmurs, rubs, or gallops.   Pulmonary/Chest: No respiratory distress. Clear to auscultation bilaterally. No wheezing or rales.  Abdominal: Soft and non-distended. Epigastric tenderness.  No rebound, guarding, or rigidity. Good bowel sounds.  Musculoskeletal: Moves all extremities. No obvious deformities. No edema. No calf tenderness.  Skin: Warm and dry.  Neurological:  Alert, awake, and appropriate.  Normal speech.  No acute focal neurological deficits are appreciated.  Psychiatric: Normal affect. Good eye contact. Appropriate in content.     ED Course   Critical Care    Date/Time: 7/5/2024 2:10 PM    Performed by: Bridgett Salguero DO  Authorized by: Bridgett Salguero DO  Direct patient critical care time: 17 minutes  Additional history critical care time: 8 minutes  Ordering / reviewing critical care time: 8 minutes  Documentation critical care time: 7 minutes  Consulting other physicians critical care time: 5 minutes  Total critical care time (exclusive of procedural time) : 45 minutes  Critical care time was exclusive of teaching time and separately billable procedures and treating other  "patients.  Critical care was necessary to treat or prevent imminent or life-threatening deterioration of the following conditions: metabolic crisis (HTN emergency).  Critical care was time spent personally by me on the following activities: development of treatment plan with patient or surrogate, discussions with consultants, evaluation of patient's response to treatment, obtaining history from patient or surrogate, examination of patient, ordering and performing treatments and interventions, ordering and review of laboratory studies, ordering and review of radiographic studies, pulse oximetry, re-evaluation of patient's condition and review of old charts.        ED Vital Signs:  Vitals:    07/05/24 1222 07/05/24 1302 07/05/24 1324 07/05/24 1332   BP: (!) 180/83 (!) 212/100 (!) 213/82 (!) 169/72   Pulse: 85 94  82   Resp: 18      Temp: 100.3 °F (37.9 °C)      SpO2: 98% 99%  98%   Weight: 61.5 kg (135 lb 9.3 oz)      Height: 5' 6" (1.676 m)       07/05/24 1402 07/05/24 1510 07/05/24 1602   BP: (!) 191/87  (!) 160/75   Pulse: 88  88   Resp:  20 19   Temp:      SpO2: 96%  99%   Weight:      Height:          Abnormal Lab Results:  Labs Reviewed   CBC W/ AUTO DIFFERENTIAL - Abnormal; Notable for the following components:       Result Value    RBC 3.85 (*)     Hemoglobin 11.6 (*)     Hematocrit 35.7 (*)     Gran # (ANC) 8.8 (*)     Gran % 78.9 (*)     Lymph % 11.6 (*)     All other components within normal limits   COMPREHENSIVE METABOLIC PANEL - Abnormal; Notable for the following components:    Potassium 2.8 (*)     CO2 19 (*)     Glucose 117 (*)     Creatinine 1.5 (*)     eGFR 35 (*)     All other components within normal limits   TROPONIN I - Abnormal; Notable for the following components:    Troponin I 0.046 (*)     All other components within normal limits   MAGNESIUM - Abnormal; Notable for the following components:    Magnesium 1.5 (*)     All other components within normal limits   LIPASE   MAGNESIUM   TROPONIN I "        All Lab Results:  Results for orders placed or performed during the hospital encounter of 07/05/24   CBC auto differential   Result Value Ref Range    WBC 11.09 3.90 - 12.70 K/uL    RBC 3.85 (L) 4.00 - 5.40 M/uL    Hemoglobin 11.6 (L) 12.0 - 16.0 g/dL    Hematocrit 35.7 (L) 37.0 - 48.5 %    MCV 93 82 - 98 fL    MCH 30.1 27.0 - 31.0 pg    MCHC 32.5 32.0 - 36.0 g/dL    RDW 13.4 11.5 - 14.5 %    Platelets 368 150 - 450 K/uL    MPV 9.7 9.2 - 12.9 fL    Immature Granulocytes 0.3 0.0 - 0.5 %    Gran # (ANC) 8.8 (H) 1.8 - 7.7 K/uL    Immature Grans (Abs) 0.03 0.00 - 0.04 K/uL    Lymph # 1.3 1.0 - 4.8 K/uL    Mono # 0.9 0.3 - 1.0 K/uL    Eos # 0.1 0.0 - 0.5 K/uL    Baso # 0.05 0.00 - 0.20 K/uL    nRBC 0 0 /100 WBC    Gran % 78.9 (H) 38.0 - 73.0 %    Lymph % 11.6 (L) 18.0 - 48.0 %    Mono % 7.9 4.0 - 15.0 %    Eosinophil % 0.8 0.0 - 8.0 %    Basophil % 0.5 0.0 - 1.9 %    Differential Method Automated    Comprehensive metabolic panel   Result Value Ref Range    Sodium 137 136 - 145 mmol/L    Potassium 2.8 (L) 3.5 - 5.1 mmol/L    Chloride 108 95 - 110 mmol/L    CO2 19 (L) 23 - 29 mmol/L    Glucose 117 (H) 70 - 110 mg/dL    BUN 13 8 - 23 mg/dL    Creatinine 1.5 (H) 0.5 - 1.4 mg/dL    Calcium 9.2 8.7 - 10.5 mg/dL    Total Protein 8.0 6.0 - 8.4 g/dL    Albumin 3.5 3.5 - 5.2 g/dL    Total Bilirubin 0.5 0.1 - 1.0 mg/dL    Alkaline Phosphatase 107 55 - 135 U/L    AST 31 10 - 40 U/L    ALT 15 10 - 44 U/L    eGFR 35 (A) >60 mL/min/1.73 m^2    Anion Gap 10 8 - 16 mmol/L   Troponin I   Result Value Ref Range    Troponin I 0.046 (H) 0.000 - 0.026 ng/mL   Lipase   Result Value Ref Range    Lipase 26 4 - 60 U/L   Magnesium   Result Value Ref Range    Magnesium 1.5 (L) 1.6 - 2.6 mg/dL        Imaging Results:  Imaging Results              X-Ray Chest AP Portable (Final result)  Result time 07/05/24 13:18:02      Final result by Ayad Conway MD (07/05/24 13:18:02)                   Impression:      No acute process  seen.      Electronically signed by: Ayad Conway MD  Date:    07/05/2024  Time:    13:18               Narrative:    EXAMINATION:  XR CHEST AP PORTABLE    CLINICAL HISTORY:  chest pain;    FINDINGS:  Single view of the chest.  No comparison    Cardiac silhouette is normal.  The lungs demonstrate no evidence of active disease.  No evidence of pleural effusion or pneumothorax.  Bones appear intact.  Moderate degenerative changes and moderate atherosclerotic disease.                                       The EKG was ordered, reviewed, and independently interpreted by the ED provider.  Interpretation time: 13:24  Rate: 89 BPM  Rhythm: Sinus rhythm with Premature atrial complexes   Interpretation: Left axis deviation. Minimal voltage criteria for LVH, may be normal variant. No STEMI.           The Emergency Provider reviewed the vital signs and test results, which are outlined above.     ED Discussion     3:45 PM: Discussed case with Osmar Ruiz MD ([facility]). Dr. Ruiz agrees with current care and management of pt and accepts admission.   Admitting Service: [facility]  Admitting Physician: Dr. Ruiz  Admit to: Inpatient tele    3:45 PM: Re-evaluated pt. I have discussed test results, shared treatment plan, and the need for admission with patient and family at bedside. Pt and family express understanding at this time and agree with all information. All questions answered. Pt and family have no further questions or concerns at this time. Pt is ready for admit.     ED Course as of 07/05/24 1628   Fri Jul 05, 2024   1407 Potassium(!): 2.8 [NF]   1441 Creatinine(!): 1.5 [NF]   1457 Magnesium (!): 1.5 [NF]   1528 78-year-old female presents with hypertensive emergency she reports chest pain.  Blood pressure elevated.  Currently 165/79.  Recently admitted for colitis and hypokalemia.  Has been unable to take her home medications.  Reports difficulty swallowing in the sensation of a food bolus.  However, she  was maintaining oral secretions and drinking water without difficulty in the emergency department.  Additionally she tolerated oral potassium.  Her potassium is low at 2.8.  She has a slight bump in her creatinine at 1.5.  Magnesium low at 1.5, IV replacement started.  Troponin elevated 0.046.  CBC and lipase are relatively unremarkable.  Chest x-ray is negative for pulmonary edema.  She will likely need admission for hypertensive emergency and electrolyte replacement. [NF]      ED Course User Index  [NF] Bridgett Salguero, DO     Medical Decision Making  Amount and/or Complexity of Data Reviewed  Labs: ordered. Decision-making details documented in ED Course.  Radiology: ordered and independent interpretation performed. Decision-making details documented in ED Course.  ECG/medicine tests: ordered and independent interpretation performed. Decision-making details documented in ED Course.    Risk  Prescription drug management.  Decision regarding hospitalization.       Additional MDM:   Differential Diagnosis:   Includes but is not limited to food impaction/bolus, CHF, KATHRIN, electrolyte derangement, ACS, pneumothorax, pneumonia, hypertensive emergency.             ED Medication(s):  Medications   magnesium sulfate 2g in water 50mL IVPB (premix) (2 g Intravenous New Bag 7/5/24 1521)   amLODIPine tablet 10 mg (has no administration in time range)   ciprofloxacin HCl tablet 750 mg (has no administration in time range)   metroNIDAZOLE tablet 500 mg (has no administration in time range)   sodium chloride 0.9% flush 10 mL (has no administration in time range)   ondansetron injection 4 mg (has no administration in time range)   promethazine suppository 25 mg (has no administration in time range)   acetaminophen tablet 650 mg (has no administration in time range)   simethicone chewable tablet 80 mg (has no administration in time range)   naloxone 0.4 mg/mL injection 0.02 mg (has no administration in time range)   enoxaparin  injection 40 mg (has no administration in time range)   potassium bicarbonate disintegrating tablet 60 mEq (has no administration in time range)   potassium bicarbonate disintegrating tablet 35 mEq (has no administration in time range)   magnesium oxide tablet 800 mg (has no administration in time range)   hydrALAZINE injection 10 mg (has no administration in time range)   pantoprazole injection 40 mg (has no administration in time range)   glucagon (human recombinant) injection 1 mg (1 mg Intravenous Given 7/5/24 1328)   labetaloL injection 10 mg (10 mg Intravenous Given 7/5/24 1324)   potassium bicarbonate disintegrating tablet 50 mEq (50 mEq Oral Given 7/5/24 1458)   potassium chloride 10 mEq in 100 mL IVPB (10 mEq Intravenous New Bag 7/5/24 1501)   nitroGLYCERIN 2% TD oint ointment 1 inch (1 inch Topical (Top) Given 7/5/24 1458)   morphine injection 4 mg (4 mg Intravenous Given 7/5/24 1510)   ondansetron injection 4 mg (4 mg Intravenous Given 7/5/24 1510)       New Prescriptions    No medications on file               Scribe Attestation:   Scribe #1: I performed the above scribed service and the documentation accurately describes the services I performed. I attest to the accuracy of the note.     Attending:   Physician Attestation Statement for Scribe #1: I, Bridgett Salguero DO, personally performed the services described in this documentation, as scribed by Radu Davis, in my presence, and it is both accurate and complete.           Clinical Impression       ICD-10-CM ICD-9-CM   1. Hypertensive emergency  I16.1 401.9   2. Food bolus obstruction of intestine  K56.699 560.89    [id number].F3XA    3. Chest pain, unspecified type  R07.9 786.50   4. KATHRIN (acute kidney injury)  N17.9 584.9   5. Hypokalemia  E87.6 276.8   6. Hypomagnesemia  E83.42 275.2   7. NSTEMI (non-ST elevated myocardial infarction)  I21.4 410.70       Disposition:   Disposition: Admitted  Condition: Fair        Bridgett Salguero DO  07/05/24 3759

## 2024-07-05 NOTE — ED NOTES
Bed: 22  Expected date:   Expected time:   Means of arrival: Personal Transportation  Comments:  Hold

## 2024-07-05 NOTE — HPI
78 year-old with PMH of HTN and colitis presented to ER 7/5/24 after being seen at urgent care and she had /88. She reports she choked while eating breakfast this am grits and toast (with butter and jelly) and felt like her food was stuck in her chest. She vomited prior to arrival. She was unable to take her medications this am. Per niece at bedside who helped with history, she moved in with her 3 weeks ago from [place] and she says her blood pressure has consistently been SBP  >200.   She was just discharged from the hospital 7/4/24 after being diagnosed with non-infective colitis, sent on Cipro/Flagyl x 7 days. She reports she was able to eat gumbo last night and take her medication without any difficulty. She denies palpitations, chills, fever. She said her stool is starting to be more firm. She complaints of chest discomfort that is reproducible. She said it will go away, but returns if she tried to drink anything.     In ER, labs revealed H&H 11.6/35.7, K 2.8, CO 2 19, Mg 1.5, Cr 1.5, troponin 0.046. CXR no acute finding. She was given 2 g Mg rider and K rider 10 mEq with oral potassium, which patient is unable to finish because it makes her chest hurt. She was given IV labetolol with improvement in BP. [facility] was consulted for to admit for dysphagia and electrolyte derangement.

## 2024-07-05 NOTE — ED NOTES
Pt resting in ED stretcher comfortably, skin warm and dry, RR even and unlabored. SHIREENS. NADN.

## 2024-07-05 NOTE — PROGRESS NOTES
"Subjective:      Patient ID: Isabel Miles is a 78 y.o. female.    Vitals:  height is 5' 6" (1.676 m) and weight is 62.1 kg (137 lb). Her oral temperature is 98 °F (36.7 °C). Her blood pressure is 193/88 (abnormal) and her pulse is 96. Her respiration is 18 and oxygen saturation is 99%.     Chief Complaint: Sore Throat    78 year old female pt with symptoms of sensation of something being stuck in her throat.  Pt states after eating grits and toast this morning she can not get them to go down. Pt's niece states pt was recently diagnosed with colitis and was given flagyl and cipro through IV and was sent home with the pill form of the medication and states pt can not take any of her medication because of food being stuck. Pt states it is causing pain down into her chest. Pt states she vomited a little up but the rest wont come up.      Sore Throat   This is a new problem. The current episode started today. The problem has been unchanged. Neither side of throat is experiencing more pain than the other. There has been no fever. The pain is at a severity of 10/10. The pain is severe. Associated symptoms include vomiting. Pertinent negatives include no abdominal pain, congestion, coughing, diarrhea, drooling, ear discharge, ear pain, headaches, hoarse voice, plugged ear sensation, neck pain, shortness of breath, stridor, swollen glands or trouble swallowing. She has had no exposure to strep or mono. She has tried nothing for the symptoms. The treatment provided no relief.       HENT:  Positive for sore throat. Negative for ear pain, ear discharge, drooling, congestion and trouble swallowing.    Neck: Negative for neck pain.   Respiratory:  Negative for cough, shortness of breath and stridor.    Gastrointestinal:  Positive for vomiting. Negative for abdominal pain and diarrhea.   Neurological:  Negative for headaches.      Objective:     Vitals:    07/05/24 1113   BP: (!) 193/88   Pulse: 96   Resp: 18   Temp: 98 °F " "(36.7 °C)   TempSrc: Oral   SpO2: 99%   Weight: 62.1 kg (137 lb)   Height: 5' 6" (1.676 m)       Physical Exam   Constitutional: She is oriented to person, place, and time. She appears well-developed. No distress.   HENT:   Head: Normocephalic and atraumatic.       Ears:   Right Ear: External ear normal.   Left Ear: External ear normal.   Nose: Nose normal. No nasal deformity. No epistaxis.   Mouth/Throat: Oropharynx is clear and moist and mucous membranes are normal. Mucous membranes are moist. No posterior oropharyngeal erythema. Oropharynx is clear.   Eyes: Lids are normal.   Neck: Trachea normal and phonation normal. Neck supple.   Cardiovascular: Normal pulses.   Pulmonary/Chest: Effort normal. No stridor. No respiratory distress. She has no wheezes. She has no rhonchi. She has no rales. She exhibits no tenderness.   Abdominal: Normal appearance and bowel sounds are normal. She exhibits no distension. Soft. There is no abdominal tenderness.   Neurological: She is alert and oriented to person, place, and time.   Skin: Skin is warm, dry and intact.   Psychiatric: Her speech is normal and behavior is normal.   Nursing note and vitals reviewed.      Assessment:     1. Obstruction of esophagus due to food impaction    2. Elevated blood pressure reading    3. Vomiting in adult        Plan:       Obstruction of esophagus due to food impaction    Elevated blood pressure reading    Vomiting in adult          Medical Decision Making:   Initial Assessment:   Vital signs stable; Elevated blood pressure but patient is uncomfortable   Patient is able to talk in complete sentences and maintain secretions   Examined oropharyngeal and there is no evidence of food bolus at the entry point of GI tract  Patient reports that she feels it slightly lower in her esophagus right before her chest (neck area)  It is my recommendation that she goes to the emergency room for further workup and management of possible food bolus  Urgent Care " Management:  See entire note for further details    Discussed with pt all pertinent UC information and results. Discussed pt dx and plan of tx.   Gave pt all f/u and return to the UC instructions. All questions and concerns were addressed at this time.   Pt expresses understanding of information and instructions, and is comfortable with plan to discharge.   Pt is stable for discharge.     I discussed with patient and/or family/caretaker that evaluation in the UC does not suggest any emergent or life threatening medical conditions requiring immediate intervention beyond what was provided in the UC, and I believe patient is safe for discharge.  Regardless, an unremarkable evaluation in the UC does not preclude the development or presence of a serious or life threatening condition. As such, patient was instructed to GO to ER immediately for any worsening or change in current symptoms.               Patient Instructions    Upon discharge, please go to the emergency room for further workup and management of suspected food bolus.    If you have been discharged from the clinic prior to your point of care test results being completed, please make sure to check your [facility] account.  If there is a change in treatment, we will communicate with you through here.  If your test is positive, and medications are ordered, these will be sent to your preferred pharmacy.   If your test is negative, no further steps needed. If you do not hear from us or have questions, please call the clinic.      - You must understand that you have received an Urgent Care treatment only and that you may be released before all of your medical problems are known or treated.   - You, the patient, will arrange for follow up care as instructed with your primary care provider or recommended specialist.   - If your condition worsens or fails to improve we recommend that you receive another evaluation at the ER immediately or contact your PCP to discuss your  concerns, or return here.   - Please do not drive or make any important decisions for 24 hours if you have received any pain medications, sedatives or mood altering drugs during your visit.    Disclaimer: This document was drafted with the use of a voice recognition device and is likely to have sound alike errors.

## 2024-07-05 NOTE — ASSESSMENT & PLAN NOTE
Patient has a current diagnosis of hypertensive urgency (without evidence of end organ damage) which is controlled.  Latest blood pressure and vitals reviewed-   Temp:  [98 °F (36.7 °C)-100.3 °F (37.9 °C)]   Pulse:  [82-96]   Resp:  [18-20]   BP: (160-213)/()   SpO2:  [96 %-99 %] .   Patient currently off IV antihypertensives.   Home meds for hypertension were reviewed and noted below.   Hypertension Medications               amLODIPine (NORVASC) 10 MG tablet Take 1 tablet (10 mg total) by mouth once daily.            Medication adjustment for hospital antihypertensives is as follows- resume home Norvasc    Will aim for controlled BP reduction by medications noted above. Monitor and mitigate end organ damage as indicated.  -PRN hydralazine for SBP > 160 or DBP > 100

## 2024-07-05 NOTE — SUBJECTIVE & OBJECTIVE
Past Medical History:   Diagnosis Date    Hypertension        Past Surgical History:   Procedure Laterality Date    CHOLECYSTECTOMY         Review of patient's allergies indicates:  No Known Allergies    Current Facility-Administered Medications on File Prior to Encounter   Medication    [DISCONTINUED] acetaminophen tablet 650 mg    [DISCONTINUED] amLODIPine tablet 5 mg    [DISCONTINUED] ciprofloxacin (CIPRO)400mg/200ml D5W IVPB 400 mg    [DISCONTINUED] dextrose 10% bolus 125 mL 125 mL    [DISCONTINUED] dextrose 10% bolus 250 mL 250 mL    [DISCONTINUED] enoxaparin injection 40 mg    [DISCONTINUED] glucagon (human recombinant) injection 1 mg    [DISCONTINUED] glucose chewable tablet 16 g    [DISCONTINUED] glucose chewable tablet 24 g    [DISCONTINUED] hydrALAZINE injection 10 mg    [DISCONTINUED] lactated ringers infusion    [DISCONTINUED] metronidazole IVPB 500 mg    [DISCONTINUED] morphine injection 2 mg    [DISCONTINUED] naloxone 0.4 mg/mL injection 0.02 mg    [DISCONTINUED] ondansetron injection 4 mg    [DISCONTINUED] potassium chloride SA CR tablet 40 mEq    [DISCONTINUED] sodium chloride 0.9% flush 10 mL     Current Outpatient Medications on File Prior to Encounter   Medication Sig    amLODIPine (NORVASC) 10 MG tablet Take 1 tablet (10 mg total) by mouth once daily.    ciprofloxacin HCl (CIPRO) 500 MG tablet Take 1 tablet (500 mg total) by mouth 2 (two) times daily. for 7 days    metroNIDAZOLE (FLAGYL) 500 MG tablet Take 1 tablet (500 mg total) by mouth 3 (three) times daily. for 7 days    potassium chloride 10% (KAYCIEL) 20 mEq/15 mL oral solution Take 30 mLs (40 mEq total) by mouth 2 (two) times daily. for 3 days     Family History    None       Tobacco Use    Smoking status: Never     Passive exposure: Current (niece smokes outside)    Smokeless tobacco: Never   Substance and Sexual Activity    Alcohol use: Never    Drug use: Never    Sexual activity: Not on file     Review of Systems   Constitutional:   Negative for fatigue and fever.   Respiratory:  Positive for chest tightness. Negative for cough, shortness of breath and wheezing.    Cardiovascular:  Negative for chest pain, palpitations and leg swelling.   Gastrointestinal:  Positive for diarrhea and nausea. Negative for abdominal pain and vomiting.   Musculoskeletal:  Negative for back pain, gait problem and myalgias.   Neurological:  Negative for dizziness, syncope and weakness.   All other systems reviewed and are negative.    Objective:     Vital Signs (Most Recent):  Temp: 100.3 °F (37.9 °C) (07/05/24 1222)  Pulse: 88 (07/05/24 1602)  Resp: 19 (07/05/24 1602)  BP: (!) 160/75 (07/05/24 1602)  SpO2: 99 % (07/05/24 1602) Vital Signs (24h Range):  Temp:  [98 °F (36.7 °C)-100.3 °F (37.9 °C)] 100.3 °F (37.9 °C)  Pulse:  [82-96] 88  Resp:  [18-20] 19  SpO2:  [96 %-99 %] 99 %  BP: (160-213)/() 160/75     Weight: 61.5 kg (135 lb 9.3 oz)  Body mass index is 21.88 kg/m².     Physical Exam  Vitals and nursing note reviewed.   Constitutional:       Appearance: Normal appearance.   HENT:      Head: Normocephalic.   Eyes:      Pupils: Pupils are equal, round, and reactive to light.   Cardiovascular:      Rate and Rhythm: Normal rate and regular rhythm.      Heart sounds: No murmur heard.     Comments: Cont telemetry monitoring  Pulmonary:      Effort: Pulmonary effort is normal.      Breath sounds: Normal breath sounds.   Abdominal:      General: Bowel sounds are normal.      Palpations: Abdomen is soft.      Tenderness: There is abdominal tenderness (tenderness to palpation).   Musculoskeletal:         General: Normal range of motion.   Skin:     General: Skin is warm and dry.      Findings: Bruising present.   Neurological:      General: No focal deficit present.      Mental Status: She is alert and oriented to person, place, and time.   Psychiatric:         Mood and Affect: Mood normal.         Behavior: Behavior normal.              CRANIAL NERVES     CN III,  IV, VI   Pupils are equal, round, and reactive to light.       Significant Labs: All pertinent labs within the past 24 hours have been reviewed.  CBC:   Recent Labs   Lab 07/05/24  1322   WBC 11.09   HGB 11.6*   HCT 35.7*        CMP:   Recent Labs   Lab 07/04/24  0518 07/05/24  1322   * 137   K 3.0* 2.8*    108   CO2 24 19*    117*   BUN 14 13   CREATININE 1.2 1.5*   CALCIUM 9.3 9.2   PROT  --  8.0   ALBUMIN  --  3.5   BILITOT  --  0.5   ALKPHOS  --  107   AST  --  31   ALT  --  15   ANIONGAP 7* 10       Significant Imaging: I have reviewed all pertinent imaging results/findings within the past 24 hours.

## 2024-07-05 NOTE — ASSESSMENT & PLAN NOTE
-likely related to GERD, hurts when she drinks something  -troponin slightly bumped 0.046, likely demand ischemia  -trend troponins  -cont telemetry monitoring  -CXR no acute finding

## 2024-07-06 VITALS
OXYGEN SATURATION: 97 % | DIASTOLIC BLOOD PRESSURE: 60 MMHG | HEIGHT: 66 IN | RESPIRATION RATE: 18 BRPM | BODY MASS INDEX: 21.69 KG/M2 | SYSTOLIC BLOOD PRESSURE: 140 MMHG | WEIGHT: 135 LBS | TEMPERATURE: 98 F | HEART RATE: 87 BPM

## 2024-07-06 LAB
ANION GAP SERPL CALC-SCNC: 8 MMOL/L (ref 8–16)
AORTIC ROOT ANNULUS: 2.59 CM
ASCENDING AORTA: 2.68 CM
AV INDEX (PROSTH): 0.75
AV MEAN GRADIENT: 11 MMHG
AV PEAK GRADIENT: 19 MMHG
AV VALVE AREA BY VELOCITY RATIO: 1.87 CM²
AV VALVE AREA: 2.27 CM²
AV VELOCITY RATIO: 0.62
BASOPHILS # BLD AUTO: 0.04 K/UL (ref 0–0.2)
BASOPHILS NFR BLD: 0.6 % (ref 0–1.9)
BSA FOR ECHO PROCEDURE: 1.69 M2
BUN SERPL-MCNC: 12 MG/DL (ref 8–23)
CALCIUM SERPL-MCNC: 8.5 MG/DL (ref 8.7–10.5)
CHLORIDE SERPL-SCNC: 106 MMOL/L (ref 95–110)
CO2 SERPL-SCNC: 23 MMOL/L (ref 23–29)
CREAT SERPL-MCNC: 1.2 MG/DL (ref 0.5–1.4)
CV ECHO LV RWT: 0.58 CM
DIFFERENTIAL METHOD BLD: ABNORMAL
DOP CALC AO PEAK VEL: 2.19 M/S
DOP CALC AO VTI: 44.2 CM
DOP CALC LVOT AREA: 3 CM2
DOP CALC LVOT DIAMETER: 1.96 CM
DOP CALC LVOT PEAK VEL: 1.36 M/S
DOP CALC LVOT STROKE VOLUME: 100.12 CM3
DOP CALC RVOT PEAK VEL: 0.79 M/S
DOP CALC RVOT VTI: 17.2 CM
DOP CALCLVOT PEAK VEL VTI: 33.2 CM
E WAVE DECELERATION TIME: 164.88 MSEC
E/A RATIO: 0.9
E/E' RATIO: 11.06 M/S
ECHO LV POSTERIOR WALL: 1.12 CM (ref 0.6–1.1)
EOSINOPHIL # BLD AUTO: 0.2 K/UL (ref 0–0.5)
EOSINOPHIL NFR BLD: 3.4 % (ref 0–8)
ERYTHROCYTE [DISTWIDTH] IN BLOOD BY AUTOMATED COUNT: 13.7 % (ref 11.5–14.5)
EST. GFR  (NO RACE VARIABLE): 46 ML/MIN/1.73 M^2
FRACTIONAL SHORTENING: 36 % (ref 28–44)
GLUCOSE SERPL-MCNC: 87 MG/DL (ref 70–110)
HCT VFR BLD AUTO: 30.4 % (ref 37–48.5)
HGB BLD-MCNC: 9.7 G/DL (ref 12–16)
IMM GRANULOCYTES # BLD AUTO: 0.02 K/UL (ref 0–0.04)
IMM GRANULOCYTES NFR BLD AUTO: 0.3 % (ref 0–0.5)
INTERVENTRICULAR SEPTUM: 1.12 CM (ref 0.6–1.1)
IVC DIAMETER: 1.97 CM
IVRT: 64.7 MSEC
LA MAJOR: 4.73 CM
LA MINOR: 4.61 CM
LA WIDTH: 3.8 CM
LEFT ATRIUM SIZE: 3.4 CM
LEFT ATRIUM VOLUME INDEX: 30.3 ML/M2
LEFT ATRIUM VOLUME: 51.28 CM3
LEFT INTERNAL DIMENSION IN SYSTOLE: 2.47 CM (ref 2.1–4)
LEFT VENTRICLE DIASTOLIC VOLUME INDEX: 37.92 ML/M2
LEFT VENTRICLE DIASTOLIC VOLUME: 64.08 ML
LEFT VENTRICLE MASS INDEX: 83 G/M2
LEFT VENTRICLE SYSTOLIC VOLUME INDEX: 12.8 ML/M2
LEFT VENTRICLE SYSTOLIC VOLUME: 21.63 ML
LEFT VENTRICULAR INTERNAL DIMENSION IN DIASTOLE: 3.85 CM (ref 3.5–6)
LEFT VENTRICULAR MASS: 141.04 G
LV LATERAL E/E' RATIO: 8.55 M/S
LV SEPTAL E/E' RATIO: 15.67 M/S
LVED V (TEICH): 64.08 ML
LVES V (TEICH): 21.63 ML
LVOT MG: 4.99 MMHG
LVOT MV: 1.1 CM/S
LYMPHOCYTES # BLD AUTO: 1.3 K/UL (ref 1–4.8)
LYMPHOCYTES NFR BLD: 18.9 % (ref 18–48)
MAGNESIUM SERPL-MCNC: 2.1 MG/DL (ref 1.6–2.6)
MCH RBC QN AUTO: 30 PG (ref 27–31)
MCHC RBC AUTO-ENTMCNC: 31.9 G/DL (ref 32–36)
MCV RBC AUTO: 94 FL (ref 82–98)
MONOCYTES # BLD AUTO: 0.9 K/UL (ref 0.3–1)
MONOCYTES NFR BLD: 13.3 % (ref 4–15)
MV PEAK A VEL: 1.04 M/S
MV PEAK E VEL: 0.94 M/S
MV STENOSIS PRESSURE HALF TIME: 47.82 MS
MV VALVE AREA P 1/2 METHOD: 4.6 CM2
NEUTROPHILS # BLD AUTO: 4.3 K/UL (ref 1.8–7.7)
NEUTROPHILS NFR BLD: 63.5 % (ref 38–73)
NRBC BLD-RTO: 0 /100 WBC
PISA MRMAX VEL: 3.25 M/S
PISA TR MAX VEL: 2.8 M/S
PLATELET # BLD AUTO: 326 K/UL (ref 150–450)
PMV BLD AUTO: 10.3 FL (ref 9.2–12.9)
POTASSIUM SERPL-SCNC: 3.3 MMOL/L (ref 3.5–5.1)
PV MEAN GRADIENT: 2 MMHG
RA MAJOR: 3.93 CM
RA PRESSURE ESTIMATED: 3 MMHG
RA WIDTH: 2.7 CM
RBC # BLD AUTO: 3.23 M/UL (ref 4–5.4)
RV TB RVSP: 6 MMHG
SODIUM SERPL-SCNC: 137 MMOL/L (ref 136–145)
STJ: 2.94 CM
TDI LATERAL: 0.11 M/S
TDI SEPTAL: 0.06 M/S
TDI: 0.09 M/S
TR MAX PG: 31 MMHG
TR MEAN GRADIENT: 25 MMHG
TRICUSPID ANNULAR PLANE SYSTOLIC EXCURSION: 2.14 CM
TROPONIN I SERPL DL<=0.01 NG/ML-MCNC: 0.04 NG/ML (ref 0–0.03)
TV REST PULMONARY ARTERY PRESSURE: 34 MMHG
WBC # BLD AUTO: 6.71 K/UL (ref 3.9–12.7)
Z-SCORE OF LEFT VENTRICULAR DIMENSION IN END DIASTOLE: -2.01
Z-SCORE OF LEFT VENTRICULAR DIMENSION IN END SYSTOLE: -1.31

## 2024-07-06 PROCEDURE — 36415 COLL VENOUS BLD VENIPUNCTURE: CPT | Performed by: HOSPITALIST

## 2024-07-06 PROCEDURE — 80048 BASIC METABOLIC PNL TOTAL CA: CPT | Performed by: NURSE PRACTITIONER

## 2024-07-06 PROCEDURE — 63600175 PHARM REV CODE 636 W HCPCS: Performed by: NURSE PRACTITIONER

## 2024-07-06 PROCEDURE — 25000003 PHARM REV CODE 250: Performed by: NURSE PRACTITIONER

## 2024-07-06 PROCEDURE — 84484 ASSAY OF TROPONIN QUANT: CPT | Performed by: HOSPITALIST

## 2024-07-06 PROCEDURE — 92610 EVALUATE SWALLOWING FUNCTION: CPT

## 2024-07-06 PROCEDURE — 83735 ASSAY OF MAGNESIUM: CPT | Performed by: NURSE PRACTITIONER

## 2024-07-06 PROCEDURE — 85025 COMPLETE CBC W/AUTO DIFF WBC: CPT | Performed by: NURSE PRACTITIONER

## 2024-07-06 PROCEDURE — 36415 COLL VENOUS BLD VENIPUNCTURE: CPT | Performed by: NURSE PRACTITIONER

## 2024-07-06 RX ORDER — CIPROFLOXACIN 750 MG/1
750 TABLET, FILM COATED ORAL EVERY 12 HOURS
Status: DISCONTINUED | OUTPATIENT
Start: 2024-07-06 | End: 2024-07-06 | Stop reason: HOSPADM

## 2024-07-06 RX ORDER — ENOXAPARIN SODIUM 100 MG/ML
40 INJECTION SUBCUTANEOUS EVERY 24 HOURS
Status: DISCONTINUED | OUTPATIENT
Start: 2024-07-06 | End: 2024-07-06 | Stop reason: HOSPADM

## 2024-07-06 RX ORDER — POTASSIUM CHLORIDE 7.45 MG/ML
10 INJECTION INTRAVENOUS
Status: COMPLETED | OUTPATIENT
Start: 2024-07-06 | End: 2024-07-06

## 2024-07-06 RX ORDER — PANTOPRAZOLE SODIUM 40 MG/1
40 TABLET, DELAYED RELEASE ORAL DAILY
Qty: 30 TABLET | Refills: 0 | Status: SHIPPED | OUTPATIENT
Start: 2024-07-06 | End: 2025-07-06

## 2024-07-06 RX ADMIN — POTASSIUM CHLORIDE 10 MEQ: 7.46 INJECTION, SOLUTION INTRAVENOUS at 10:07

## 2024-07-06 RX ADMIN — METRONIDAZOLE 500 MG: 500 TABLET ORAL at 09:07

## 2024-07-06 RX ADMIN — AMLODIPINE BESYLATE 10 MG: 10 TABLET ORAL at 09:07

## 2024-07-06 RX ADMIN — POTASSIUM CHLORIDE 10 MEQ: 7.46 INJECTION, SOLUTION INTRAVENOUS at 09:07

## 2024-07-06 RX ADMIN — PANTOPRAZOLE SODIUM 40 MG: 40 INJECTION, POWDER, FOR SOLUTION INTRAVENOUS at 09:07

## 2024-07-06 RX ADMIN — CIPROFLOXACIN 750 MG: 750 TABLET, FILM COATED ORAL at 09:07

## 2024-07-06 NOTE — PLAN OF CARE
[facility] - Med Surg  Discharge Final Note    Primary Care Provider: No, Primary Doctor    Expected Discharge Date: 7/6/2024    Final Discharge Note (most recent)       Final Note - 07/06/24 1558          Final Note    Assessment Type Final Discharge Note     Anticipated Discharge Disposition Home or Self Care        Post-Acute Status    Discharge Delays None known at this time                     Important Message from Medicare             Contact Info       Moose Kruse NP   Specialty: Family Medicine    [address] [address]   Phone: [phone]       Next Steps: Schedule an appointment as soon as possible for a visit in 3 day(s)    Instructions: call office and schedule hospital follow up in 3-5 days          Discharge home, no home health or dme orders noted.

## 2024-07-06 NOTE — ASSESSMENT & PLAN NOTE
Patient has a current diagnosis of hypertensive urgency (without evidence of end organ damage) which is controlled.  Latest blood pressure and vitals reviewed-   Temp:  [98 °F (36.7 °C)-100.3 °F (37.9 °C)]   Pulse:  [82-96]   Resp:  [18-20]   BP: (160-213)/()   SpO2:  [96 %-99 %] .   Patient currently off IV antihypertensives.   Home meds for hypertension were reviewed and noted below.   Hypertension Medications               amLODIPine (NORVASC) 10 MG tablet Take 1 tablet (10 mg total) by mouth once daily.            Medication adjustment for hospital antihypertensives is as follows- resume home Norvasc    Will aim for controlled BP reduction by medications noted above. Monitor and mitigate end organ damage as indicated.  -PRN hydralazine for SBP > 160 or DBP > 100   Patient to ER bed 8 to gown for evaluation. Side rails up.

Report given to Jah SAUNDERS .

## 2024-07-06 NOTE — HOSPITAL COURSE
"78 year-old female admitted with hypertensive urgency and says she choked on breakfast and felt like her food got stuck in her chest and she vomited. She had some chest tenderness and reproducible pain after. Troponins slightly elevated on admission, trend was flat.  Started on Protonix on admission.   ST evaluated patient and recommended an easy to chew diet IDDSI level 7, thin liquids; and recommended GI consult for evaluation.   Patient was able to tolerated her diet without any complaints.     Referral sent to GI on discharge for outpatient follow up. Discussed with claudia at bedside. Will send with a prescription for Protonix as well, possibly reflux component due to "chest discomfort" that occurs after she eats.   Blood pressure is controlled, she was able to tolerate oral medications prior to discharge.     Her diarrhea is improving, recommended to continue her antibiotic course of Cipro and Flagyl until completed.     Follow up with PCP in 3-5 days for hospital follow up.     Patient seen and examined on the day of discharge.  All questions and concerns were addressed prior to discharge.    Face to face encounter with patient: 33 minutes  "

## 2024-07-06 NOTE — ASSESSMENT & PLAN NOTE
Patient has Abnormal Magnesium: hypomagnesemia. Will continue to monitor electrolytes closely. Will replace the affected electrolytes and repeat labs to be done after interventions completed. The patient's magnesium results have been reviewed and are listed below.  Recent Labs   Lab 07/06/24  0442   MG 2.1      -Mg rider 2 gm in ER  -repeat Mg level in am, replete if needed

## 2024-07-06 NOTE — PLAN OF CARE
Discussed poc with pt, pt verbalized understanding    Purposeful rounding every 2hours    VS wnl  Cardiac monitoring in use, pt is NSR, tele monitor # [id number]  Fall precautions in place, remains injury free  Pt denies c/o pain and nausea      Abx given as prescribed  Bed locked at lowest position  Call light within reach    Chart check complete  Patient is ready for discharge

## 2024-07-06 NOTE — PLAN OF CARE
[facility] - Med Surg  Initial Discharge Assessment       Primary Care Provider: No, Primary Doctor    Admission Diagnosis: Hypokalemia [E87.6]  Hypomagnesemia [E83.42]  NSTEMI (non-ST elevated myocardial infarction) [I21.4]  KATHRIN (acute kidney injury) [N17.9]  Hypertensive emergency [I16.1]  Chest pain, unspecified type [R07.9]  Food bolus obstruction of intestine [K56.699, W44.F3XA]    Admission Date: 7/5/2024  Expected Discharge Date:     Transition of Care Barriers: (P) None    Payor: MEDICARE / Plan: MEDICARE RAILROAD RETIREMENT / Product Type: Government /     Extended Emergency Contact Information  Primary Emergency Contact: Autumn Mae  Mobile Phone: [phone]  Relation: Other  Preferred language: English   needed? No  Secondary Emergency Contact: Angelique Mae  Mobile Phone: [phone]  Relation: Relative    Discharge Plan A: (P) Home with family         [facility] [place] - [address] [facility] [address]  [address]  [place] [address]  Phone: [phone] Fax: [phone]    [id number]  [place] - [address]  [address]  [place] [address]  Phone: [phone] Fax: [phone]      Initial Assessment (most recent)       Adult Discharge Assessment - 07/06/24 1158          Discharge Assessment    Assessment Type Discharge Planning Assessment     Confirmed/corrected address, phone number and insurance Yes     Confirmed Demographics Correct on Facesheet     Source of Information patient     Communicated LUKASZ with patient/caregiver Date not available/Unable to determine     Reason For Admission Hypertensive urgency     People in Home other relative(s)     Facility Arrived From: Niece's , and son     Do you expect to return to your current living situation? Yes     Do you have help at home or someone to help you manage your care at home? Yes     Who are your caregiver(s) and their phone number(s)? Autumn Mae - niece     Prior to hospitilization  cognitive status: Alert/Oriented     Current cognitive status: Alert/Oriented     Walking or Climbing Stairs Difficulty no     Dressing/Bathing Difficulty no     Home Accessibility wheelchair accessible     Home Layout Able to live on 1st floor     Equipment Currently Used at Home none     Readmission within 30 days? Yes     Patient currently being followed by outpatient case management? No     Do you currently have service(s) that help you manage your care at home? No     Do you take prescription medications? Yes     Do you have prescription coverage? Yes     Coverage MEDICARE - MEDICARE RAILROAD [employer]     Do you have any problems affording any of your prescribed medications? No (P)      Is the patient taking medications as prescribed? yes (P)      Who is going to help you get home at discharge? Autumn taylor (P)      How do you get to doctors appointments? family or friend will provide (P)      Are you on dialysis? No (P)      Do you take coumadin? No (P)      Discharge Plan A Home with family (P)      DME Needed Upon Discharge  none (P)      Discharge Plan discussed with: Patient (P)      Transition of Care Barriers None (P)         OTHER    Name(s) of People in Home Angelique taylor, Niece's , and son (P)                       Dayanna Gibson LMSW 7/6/2024 12:04 PM

## 2024-07-06 NOTE — ASSESSMENT & PLAN NOTE
-start on CLD advance as tolerated  -ST consulted to eval and treat, recommended IDDSI level 7 diet with thin liquids, easy to chew diet  -Referral to GI sent on discharge  -cont PPI

## 2024-07-06 NOTE — ASSESSMENT & PLAN NOTE
Patient has hypokalemia which is Acute on Chronic and currently uncontrolled. Most recent potassium levels reviewed-   Lab Results   Component Value Date    K 3.3 (L) 07/06/2024   . Will continue potassium replacement per protocol and recheck repeat levels after replacement completed.   Given K-rider 10 mEq in ER, oral potassium attempted in ER, patient cannot finish it says it makes her chest hurt

## 2024-07-06 NOTE — PT/OT/SLP EVAL
Speech Language Pathology Evaluation  Bedside Swallow    Patient Name:  Isabel Miles   MRN:  [id number]  Admitting Diagnosis: Hypertensive urgency    Recommendations:                 General Recommendations:   Swallow precautions  Diet recommendations:  Easy to Chew Diet - IDDSI Level 7, Thin liquids - IDDSI Level 0   Aspiration Precautions: 1 bite/sip at a time, Alternating bites/sips, Avoid talking while eating, Meds whole 1 at a time, Small bites/sips, and Standard aspiration precautions   General Precautions: Standard, aspiration  Communication strategies:  none    Assessment:     Isabel Miles is a 78 y.o. female who was presented with thin liquids, pureed and a yaa cracker.  She swallowed timely with good tongue movement.  There was no pocketing, residue, no coughing or changes in vocal quality on any consistency.  Basic swallowing precautions reviewed.  Pt complains of food sticking in her chest area when swallowing.   She is recommended for 1 follow-up with meal to further assess for any oral or pharyngeal stage swallowing difficulties with further recs if indicated.       Pt is recommended for a GI assessment to assess esophageal level of swallowing/management.      History:   Pt is an 78 year old female admitted due to choking while eating grits and toast and reported food was stuck in her chest.  CXR was normal.  Pt with recent admission and d/c on 7/4/24 with cholitis.   Past Medical History:   Diagnosis Date    Hypertension        Past Surgical History:   Procedure Laterality Date    CHOLECYSTECTOMY         Social History: Patient lives with her niece.      Prior Intubation HX:  none      Modified Barium Swallow: none reported     Chest X-Rays: normal; no infiltrates reported     Prior diet: Regular consistency, thin liquids    Occupation/hobbies/homemaking: n/a.    Subjective     Pt sitting up in bed and willing to work with S.T.  Patient goals: to get back home to my animals per pt report       Pain/Comfort:  Pain Rating 1: 0/10    Respiratory Status:  see medical chart    Objective:     Oral Musculature Evaluation  Oral Musculature: WFL  Dentition: scattered dentition  Mucosal Quality: cracked  Velar Elevation: WFL    Bedside Swallow Eval:   Consistencies Assessed:  Thin liquids no delay, no coughing or changes in vocal quality  Puree no delay, no coughing or changes in vocal quality  Solids no delay, no coughing or changes in vocal quality      Oral Phase:   WFL    Pharyngeal Phase:   WFL:  No delays, no coughing or changes in vocal quality    Compensatory Strategies  Basic swallowing precautions recommended     Treatment: Pt was oriented, followed commands and answered all questions appropriately.    Goals:   Multidisciplinary Problems       SLP Goals          Problem: SLP    Goal Priority Disciplines Outcome   SLP Goal     SLP    Description: 1. Pt will tolerate a regular consistency diet and thin liquids without signs of swallowing difficulties.                        Plan:     Patient to be seen:  2 x/week   Plan of Care expires:  07/13/24  Plan of Care reviewed with:  patient   SLP Follow-Up:  Yes       Discharge recommendations:    determine at d/c  Barriers to Discharge:   n/a    Time Tracking:     SLP Treatment Date:   07/06/24  Speech Start Time:  1102  Speech Stop Time:  1130     Speech Total Time (min):  28 min    Billable Minutes: Eval Swallow and Oral Function 28 minutes     07/06/2024

## 2024-07-06 NOTE — PLAN OF CARE
S.T./Swallowing evaluation completed.  Pt tolerated thin liquids, pureed and yaa cracker without signs of swallowing difficulties.  No coughing, wet vocal quality or swallow delays.  Pt reporting food sticking her chest.  She is recommended for a GI assessment to assess esophageal stage of swallowing.

## 2024-07-06 NOTE — PROGRESS NOTES
Pharmacist Renal Dose Adjustment Note    Isabel Miles is a 78 y.o. female being treated with the medication Ciprofloxacin    Patient Data:    Vital Signs (Most Recent):  Temp: 100.3 °F (37.9 °C) (07/05/24 1222)  Pulse: 88 (07/05/24 1602)  Resp: 19 (07/05/24 1602)  BP: (!) 160/75 (07/05/24 1602)  SpO2: 99 % (07/05/24 1602) Vital Signs (72h Range):  Temp:  [96.9 °F (36.1 °C)-100.3 °F (37.9 °C)]   Pulse:  []   Resp:  [15-20]   BP: (134-213)/()   SpO2:  [96 %-100 %]      Recent Labs   Lab 07/03/24  0425 07/04/24  0518 07/05/24  1322   CREATININE 1.1 1.2 1.5*     Serum creatinine: 1.5 mg/dL (H) 07/05/24 1322  Estimated creatinine clearance: 28 mL/min (A)    Medication:Ciprofloxacin dose: 500mg frequency BID will be changed to medication:Ciprofloxacin dose:750mg frequency:q24h    Pharmacist's Name: Lj Johnson  Pharmacist's Extension: [phone]    
Pharmacist Renal Dose Adjustment Note    Isabel Miles is a 78 y.o. female being treated with the medication cipro     Patient Data:    Vital Signs (Most Recent):  Temp: 98.3 °F (36.8 °C) (07/06/24 0744)  Pulse: 92 (07/06/24 0744)  Resp: 17 (07/06/24 0744)  BP: 134/63 (07/06/24 0744)  SpO2: (!) 94 % (07/06/24 0744) Vital Signs (72h Range):  Temp:  [97.5 °F (36.4 °C)-100.3 °F (37.9 °C)]   Pulse:  [79-99]   Resp:  [15-20]   BP: (134-213)/()   SpO2:  [94 %-99 %]      Recent Labs   Lab 07/04/24  0518 07/05/24  1322 07/06/24  0442   CREATININE 1.2 1.5* 1.2     Serum creatinine: 1.2 mg/dL 07/06/24 0442  Estimated creatinine clearance: 35 mL/min    Medication:cipro 750mg daily will be changed to cipro 750mg BID for crcl > 30 ml/min    Pharmacist's Name: Dianne Simeon  Pharmacist's Extension: [phone]    
Pharmacist Renal Dose Adjustment Note    Isabel Miles is a 78 y.o. female being treated with the medication enoxaparin.    Patient Data:    Vital Signs (Most Recent):  Temp: 99 °F (37.2 °C) (07/05/24 1658)  Pulse: 84 (07/05/24 1658)  Resp: 20 (07/05/24 1658)  BP: (!) 177/78 (07/05/24 1658)  SpO2: 98 % (07/05/24 1658) Vital Signs (72h Range):  Temp:  [96.9 °F (36.1 °C)-100.3 °F (37.9 °C)]   Pulse:  []   Resp:  [15-20]   BP: (134-213)/()   SpO2:  [96 %-99 %]      Recent Labs   Lab 07/03/24  0425 07/04/24  0518 07/05/24  1322   CREATININE 1.1 1.2 1.5*     Serum creatinine: 1.5 mg/dL (H) 07/05/24 1322  Estimated creatinine clearance: 28 mL/min (A)    Medication: enoxaparin 40 mg SQ daily will be changed to enoxaparin 30 mg SQ daily per pharmacy renal dose adjustment protocol for patients with CrCl less than 30 mL/min.    Pharmacist's Name: Payton Mendez PharmD  Pharmacist's Extension: [phone]     Thank you for allowing us to participate in this patient's care.     Payton Mendez PharmD 07/05/2024 7:41 PM  
Pharmacist Renal Dose Adjustment Note    Isabel Miles is a 78 y.o. female being treated with the medication lovenox     Patient Data:    Vital Signs (Most Recent):  Temp: 98.3 °F (36.8 °C) (07/06/24 0744)  Pulse: 92 (07/06/24 0744)  Resp: 17 (07/06/24 0744)  BP: 134/63 (07/06/24 0744)  SpO2: (!) 94 % (07/06/24 0744) Vital Signs (72h Range):  Temp:  [97.5 °F (36.4 °C)-100.3 °F (37.9 °C)]   Pulse:  [79-99]   Resp:  [15-20]   BP: (134-213)/()   SpO2:  [94 %-99 %]      Recent Labs   Lab 07/04/24  0518 07/05/24  1322 07/06/24  0442   CREATININE 1.2 1.5* 1.2     Serum creatinine: 1.2 mg/dL 07/06/24 0442  Estimated creatinine clearance: 35 mL/min    Medication:lovenox 30mg daily will be changed to lovenox 40mg daily for crcl > 30 ml/min    Pharmacist's Name: Dianne Simeon  Pharmacist's Extension: [phone]    
Abdominal Pain, N/V/D

## 2024-07-06 NOTE — DISCHARGE SUMMARY
[facility] Medicine  Discharge Summary      Patient Name: Isabel Miles  MRN: [id number]  GAGANDEEP: [id number]  Patient Class: IP- Inpatient  Admission Date: 7/5/2024  Hospital Length of Stay: 1 days  Discharge Date and Time:  07/06/2024 2:24 PM  Attending Physician: Osmar Ruiz MD   Discharging Provider: Autumn Choe NP  Primary Care Provider: [facility] Primary Doctor    Primary Care Team: Networked reference to record PCT     HPI:   78 year-old with PMH of HTN and colitis presented to ER 7/5/24 after being seen at urgent care and she had /88. She reports she choked while eating breakfast this am grits and toast (with butter and jelly) and felt like her food was stuck in her chest. She vomited prior to arrival. She was unable to take her medications this am. Per niece at bedside who helped with history, she moved in with her 3 weeks ago from [place] and she says her blood pressure has consistently been SBP  >200.   She was just discharged from the hospital 7/4/24 after being diagnosed with non-infective colitis, sent on Cipro/Flagyl x 7 days. She reports she was able to eat gumbo last night and take her medication without any difficulty. She denies palpitations, chills, fever. She said her stool is starting to be more firm. She complaints of chest discomfort that is reproducible. She said it will go away, but returns if she tried to drink anything.     In ER, labs revealed H&H 11.6/35.7, K 2.8, CO 2 19, Mg 1.5, Cr 1.5, troponin 0.046. CXR no acute finding. She was given 2 g Mg rider and K rider 10 mEq with oral potassium, which patient is unable to finish because it makes her chest hurt. She was given IV labetolol with improvement in BP. [facility] was consulted for to admit for dysphagia and electrolyte derangement.     * No surgery found *      Hospital Course:   78 year-old female admitted with hypertensive urgency and says she choked on breakfast and felt like her food got stuck in her  "chest and she vomited. She had some chest tenderness and reproducible pain after. Troponins slightly elevated on admission, trend was flat.  Started on Protonix on admission.   ST evaluated patient and recommended an easy to chew diet IDDSI level 7, thin liquids; and recommended GI consult for evaluation.   Patient was able to tolerated her diet without any complaints.     Referral sent to GI on discharge for outpatient follow up. Discussed with niece at bedside. Will send with a prescription for Protonix as well, possibly reflux component due to "chest discomfort" that occurs after she eats.   Blood pressure is controlled, she was able to tolerate oral medications prior to discharge.     Her diarrhea is improving, recommended to continue her antibiotic course of Cipro and Flagyl until completed.     Follow up with PCP in 3-5 days for hospital follow up.     Patient seen and examined on the day of discharge.  All questions and concerns were addressed prior to discharge.    Face to face encounter with patient: 33 minutes     Goals of Care Treatment Preferences:  Code Status: Full Code      Consults:     Cardiac/Vascular  * Hypertensive urgency  Patient has a current diagnosis of hypertensive urgency (without evidence of end organ damage) which is controlled.  Latest blood pressure and vitals reviewed-   Temp:  [98 °F (36.7 °C)-100.3 °F (37.9 °C)]   Pulse:  [82-96]   Resp:  [18-20]   BP: (160-213)/()   SpO2:  [96 %-99 %] .   Patient currently off IV antihypertensives.   Home meds for hypertension were reviewed and noted below.   Hypertension Medications               amLODIPine (NORVASC) 10 MG tablet Take 1 tablet (10 mg total) by mouth once daily.            Medication adjustment for hospital antihypertensives is as follows- resume home Norvasc    Will aim for controlled BP reduction by medications noted above. Monitor and mitigate end organ damage as indicated.  -PRN hydralazine for SBP > 160 or DBP > " 100    Chest pain in adult  -likely related to GERD, hurts when she drinks something  -troponin slightly bumped 0.046, likely demand ischemia  -trend troponins  -cont telemetry monitoring  -CXR no acute finding      Renal/  Hypomagnesemia  Patient has Abnormal Magnesium: hypomagnesemia. Will continue to monitor electrolytes closely. Will replace the affected electrolytes and repeat labs to be done after interventions completed. The patient's magnesium results have been reviewed and are listed below.  Recent Labs   Lab 07/06/24  0442   MG 2.1      -Mg rider 2 gm in ER  -repeat Mg level in am, replete if needed    Hypokalemia  Patient has hypokalemia which is Acute on Chronic and currently uncontrolled. Most recent potassium levels reviewed-   Lab Results   Component Value Date    K 3.3 (L) 07/06/2024   . Will continue potassium replacement per protocol and recheck repeat levels after replacement completed.   Given K-rider 10 mEq in ER, oral potassium attempted in ER, patient cannot finish it says it makes her chest hurt    GI  Other dysphagia  -start on CLD advance as tolerated  -ST consulted to eval and treat, recommended IDDSI level 7 diet with thin liquids, easy to chew diet  -Referral to GI sent on discharge  -cont PPI    Colitis  -stool is not as loose and has firmed up per patient, she no longer has abdominal pain, but has some tenderness to palpation  -cont Cipro and Flagyl PO   -C-diff negative on prior admission  -elevated inflammatory markers      Final Active Diagnoses:    Diagnosis Date Noted POA    PRINCIPAL PROBLEM:  Hypertensive urgency [I16.0] 07/02/2024 Yes    Hypomagnesemia [E83.42] 07/05/2024 Yes    Other dysphagia [R13.19] 07/05/2024 Yes    Chest pain in adult [R07.9] 07/05/2024 Yes    Colitis [K52.9] 07/02/2024 Yes    Hypokalemia [E87.6] 07/02/2024 Yes      Problems Resolved During this Admission:       Discharged Condition: good    Disposition: Home or Self Care    Follow Up:   Follow-up  Information       Moose Kruse, JOVITA. Schedule an appointment as soon as possible for a visit in 3 day(s).    Specialty: Family Medicine  Why: call office and schedule hospital follow up in 3-5 days  Contact information:  [address] [place] [address] [phone]                           Patient Instructions:      Ambulatory referral/consult to Gastroenterology   Standing Status: Future   Referral Priority: Routine Referral Type: Consultation   Referral Reason: Specialty Services Required   Requested Specialty: Gastroenterology   Number of Visits Requested: 1     Diet Cardiac     Activity as tolerated       Significant Diagnostic Studies: Labs: CMP   Recent Labs   Lab 07/05/24  1322 07/06/24  0442    137   K 2.8* 3.3*    106   CO2 19* 23   * 87   BUN 13 12   CREATININE 1.5* 1.2   CALCIUM 9.2 8.5*   PROT 8.0  --    ALBUMIN 3.5  --    BILITOT 0.5  --    ALKPHOS 107  --    AST 31  --    ALT 15  --    ANIONGAP 10 8    and CBC   Recent Labs   Lab 07/05/24  1322 07/06/24  0442   WBC 11.09 6.71   HGB 11.6* 9.7*   HCT 35.7* 30.4*    326       Pending Diagnostic Studies:       None           Medications:  Reconciled Home Medications:      Medication List        START taking these medications      pantoprazole 40 MG tablet  Commonly known as: PROTONIX  Take 1 tablet (40 mg total) by mouth once daily.            CONTINUE taking these medications      amLODIPine 10 MG tablet  Commonly known as: NORVASC  Take 1 tablet (10 mg total) by mouth once daily.     ciprofloxacin HCl 500 MG tablet  Commonly known as: CIPRO  Take 1 tablet (500 mg total) by mouth 2 (two) times daily. for 7 days     metroNIDAZOLE 500 MG tablet  Commonly known as: FLAGYL  Take 1 tablet (500 mg total) by mouth 3 (three) times daily. for 7 days     potassium chloride 10% 20 mEq/15 mL oral solution  Commonly known as: KAYCIEL  Take 30 mLs (40 mEq total) by mouth 2 (two) times daily. for 3 days              Indwelling  Lines/Drains at time of discharge:   Lines/Drains/Airways       None                   Time spent on the discharge of patient: 41 minutes         Autumn Choe NP  Department of Hospital Medicine  [facility]

## 2024-07-07 PROBLEM — E87.6 HYPOKALEMIA: Status: RESOLVED | Noted: 2024-07-02 | Resolved: 2024-07-07

## 2024-07-07 PROBLEM — R82.90 ABNORMAL URINALYSIS: Status: RESOLVED | Noted: 2024-07-02 | Resolved: 2024-07-07

## 2024-07-07 PROBLEM — E83.42 HYPOMAGNESEMIA: Status: RESOLVED | Noted: 2024-07-05 | Resolved: 2024-07-07

## 2024-07-07 PROBLEM — R07.9 CHEST PAIN IN ADULT: Status: RESOLVED | Noted: 2024-07-05 | Resolved: 2024-07-07

## 2024-07-07 PROBLEM — R19.7 DIARRHEA: Status: RESOLVED | Noted: 2024-07-02 | Resolved: 2024-07-07

## 2024-07-07 PROBLEM — R13.19 OTHER DYSPHAGIA: Status: RESOLVED | Noted: 2024-07-05 | Resolved: 2024-07-07

## 2024-07-07 PROBLEM — I16.1 HYPERTENSIVE EMERGENCY: Status: RESOLVED | Noted: 2024-07-05 | Resolved: 2024-07-07

## 2024-07-07 PROBLEM — I16.0 HYPERTENSIVE URGENCY: Status: RESOLVED | Noted: 2024-07-02 | Resolved: 2024-07-07

## 2024-07-07 PROBLEM — I10 HYPERTENSION: Status: ACTIVE | Noted: 2024-07-07

## 2024-07-08 LAB
OHS QRS DURATION: 82 MS
OHS QTC CALCULATION: 472 MS

## 2024-07-11 ENCOUNTER — OFFICE VISIT (OUTPATIENT)
Dept: FAMILY MEDICINE | Facility: CLINIC | Age: 79
End: 2024-07-11
Payer: MEDICARE

## 2024-07-11 ENCOUNTER — LAB VISIT (OUTPATIENT)
Dept: LAB | Facility: HOSPITAL | Age: 79
End: 2024-07-11
Attending: REGISTERED NURSE
Payer: MEDICARE

## 2024-07-11 VITALS
HEART RATE: 99 BPM | DIASTOLIC BLOOD PRESSURE: 70 MMHG | BODY MASS INDEX: 21.51 KG/M2 | TEMPERATURE: 97 F | RESPIRATION RATE: 18 BRPM | SYSTOLIC BLOOD PRESSURE: 168 MMHG | OXYGEN SATURATION: 96 % | HEIGHT: 66 IN | WEIGHT: 133.81 LBS

## 2024-07-11 DIAGNOSIS — R79.89 ELEVATED TROPONIN: ICD-10-CM

## 2024-07-11 DIAGNOSIS — I10 HYPERTENSION, UNSPECIFIED TYPE: ICD-10-CM

## 2024-07-11 DIAGNOSIS — K21.9 GASTROESOPHAGEAL REFLUX DISEASE WITHOUT ESOPHAGITIS: ICD-10-CM

## 2024-07-11 DIAGNOSIS — N18.30 STAGE 3 CHRONIC KIDNEY DISEASE, UNSPECIFIED WHETHER STAGE 3A OR 3B CKD: ICD-10-CM

## 2024-07-11 DIAGNOSIS — E87.6 HYPOKALEMIA: ICD-10-CM

## 2024-07-11 DIAGNOSIS — M54.50 ACUTE LEFT-SIDED LOW BACK PAIN WITHOUT SCIATICA: ICD-10-CM

## 2024-07-11 DIAGNOSIS — Z09 HOSPITAL DISCHARGE FOLLOW-UP: Primary | ICD-10-CM

## 2024-07-11 DIAGNOSIS — Z09 HOSPITAL DISCHARGE FOLLOW-UP: ICD-10-CM

## 2024-07-11 DIAGNOSIS — D64.9 ANEMIA, UNSPECIFIED TYPE: ICD-10-CM

## 2024-07-11 DIAGNOSIS — K52.9 COLITIS: ICD-10-CM

## 2024-07-11 LAB
ANION GAP SERPL CALC-SCNC: 8 MMOL/L (ref 8–16)
BUN SERPL-MCNC: 10 MG/DL (ref 8–23)
CALCIUM SERPL-MCNC: 9.4 MG/DL (ref 8.7–10.5)
CHLORIDE SERPL-SCNC: 105 MMOL/L (ref 95–110)
CO2 SERPL-SCNC: 23 MMOL/L (ref 23–29)
CREAT SERPL-MCNC: 1.2 MG/DL (ref 0.5–1.4)
ERYTHROCYTE [DISTWIDTH] IN BLOOD BY AUTOMATED COUNT: 14.1 % (ref 11.5–14.5)
EST. GFR  (NO RACE VARIABLE): 45.8 ML/MIN/1.73 M^2
GLUCOSE SERPL-MCNC: 98 MG/DL (ref 70–110)
HCT VFR BLD AUTO: 36.5 % (ref 37–48.5)
HGB BLD-MCNC: 11.5 G/DL (ref 12–16)
MCH RBC QN AUTO: 30.3 PG (ref 27–31)
MCHC RBC AUTO-ENTMCNC: 31.5 G/DL (ref 32–36)
MCV RBC AUTO: 96 FL (ref 82–98)
PLATELET # BLD AUTO: 361 K/UL (ref 150–450)
PMV BLD AUTO: 11.3 FL (ref 9.2–12.9)
POTASSIUM SERPL-SCNC: 3.6 MMOL/L (ref 3.5–5.1)
RBC # BLD AUTO: 3.8 M/UL (ref 4–5.4)
SODIUM SERPL-SCNC: 136 MMOL/L (ref 136–145)
WBC # BLD AUTO: 7.57 K/UL (ref 3.9–12.7)

## 2024-07-11 PROCEDURE — 36415 COLL VENOUS BLD VENIPUNCTURE: CPT | Mod: PO | Performed by: REGISTERED NURSE

## 2024-07-11 PROCEDURE — 99214 OFFICE O/P EST MOD 30 MIN: CPT | Mod: PBBFAC,PO | Performed by: REGISTERED NURSE

## 2024-07-11 PROCEDURE — 80048 BASIC METABOLIC PNL TOTAL CA: CPT | Performed by: REGISTERED NURSE

## 2024-07-11 PROCEDURE — 99999 PR PBB SHADOW E&M-EST. PATIENT-LVL IV: CPT | Mod: PBBFAC,,, | Performed by: REGISTERED NURSE

## 2024-07-11 PROCEDURE — 85027 COMPLETE CBC AUTOMATED: CPT | Performed by: REGISTERED NURSE

## 2024-07-11 RX ORDER — LIDOCAINE 50 MG/G
1 PATCH TOPICAL DAILY PRN
Qty: 15 PATCH | Refills: 0 | Status: SHIPPED | OUTPATIENT
Start: 2024-07-11

## 2024-07-11 NOTE — PROGRESS NOTES
Transitional Care Note    Family and/or Caretaker present at visit?  Yes (claudia Leyva)  Diagnostic tests reviewed/disposition: I have reviewed all completed as well as pending diagnostic tests at the time of discharge.  Disease/illness education:   Discussed, all questions answered.  Home health/community services discussion/referrals: Patient does not have home health established from hospital visit.  They do not need home health.  If needed, we will set up home health for the patient.   Establishment or re-establishment of referral orders for community resources: No other necessary community resources.   Discussion with other health care providers: No discussion with other health care providers necessary.    SUBJECTIVE:      Isabel Miles is a 78 y.o. female here to the office today for:  HOSPITAL FOLLOW-UP    HPI:    Isabel Miles is here today for a hospital follow-up.  I have reviewed the patient's medical history in detail and updated the computerized patient record.    ADMITTED TO:  [facility]  DATES OF SERVICE:  7/5/24 to 7/6/24    HPI:   78 year-old with PMH of HTN and colitis presented to ER 7/5/24 after being seen at urgent care and she had /88. She reports she choked while eating breakfast this am grits and toast (with butter and jelly) and felt like her food was stuck in her chest. She vomited prior to arrival. She was unable to take her medications this am. Per claudia at bedside who helped with history, she moved in with her 3 weeks ago from [place] and she says her blood pressure has consistently been SBP  >200.     She was just discharged from the hospital 7/4/24 after being diagnosed with non-infective colitis, sent on Cipro/Flagyl x 7 days. She reports she was able to eat gumbo last night and take her medication without any difficulty. She denies palpitations, chills, fever. She said her stool is starting to be more firm. She complaints of chest discomfort that is reproducible. She  "said it will go away, but returns if she tried to drink anything.      In ER, labs revealed H&H 11.6/35.7, K 2.8, CO 2 19, Mg 1.5, Cr 1.5, troponin 0.046. CXR no acute finding. She was given 2 g Mg rider and K rider 10 mEq with oral potassium, which patient is unable to finish because it makes her chest hurt. She was given IV labetolol with improvement in BP. [facility] was consulted for to admit for dysphagia and electrolyte derangement.       Hospital Course:   78 year-old female admitted with hypertensive urgency and says she choked on breakfast and felt like her food got stuck in her chest and she vomited. She had some chest tenderness and reproducible pain after. Troponins slightly elevated on admission, trend was flat.  Started on Protonix on admission.   ST evaluated patient and recommended an easy to chew diet IDDSI level 7, thin liquids; and recommended GI consult for evaluation.   Patient was able to tolerated her diet without any complaints.      Referral sent to GI on discharge for outpatient follow up. Discussed with claudia at bedside. Will send with a prescription for Protonix as well, possibly reflux component due to "chest discomfort" that occurs after she eats.   Blood pressure is controlled, she was able to tolerate oral medications prior to discharge.      Her diarrhea is improving, recommended to continue her antibiotic course of Cipro and Flagyl until completed.      Follow up with PCP in 3-5 days for hospital follow up.      -------------    She has completed K-supplement but reports caused mouth burning.  She did take 5 days worth of her Flagyl & Cipro although 7 days ordered.  She reports not being able to continue due to oral/mouth burning, rx caused increased irritation that K-supplement triggered.  She was tx with same abx in hospital for 4 days so should be adequate.  Feeling well, although since in hospital, her left lower back has been bothering her.  Tylenol has not helped.  On PPI as ordered, " feeling better with decreased acid and chest symptoms.  Denies abd pain, bloating, fever, chills, or NV.  Diarrhea has resolved.  Does take HTN medication as ordered but not until around 0830 daily; she has not taken HTN medication today yet.  Home bp readings ~ 135-140 over 70 to 80's, much improved from before.  Concerned with lower extremity edema, wax/waning.  Hospital advised her to f/u with GI and Cardiology for f/u and further evaluation.      REVIEW OF SYSTEMS:  Review of Systems   Constitutional:  Positive for activity change, appetite change and fatigue. Negative for chills, diaphoresis and fever.        Wt Readings from Last 5 Encounters:  07/11/24 : 60.7 kg (133 lb 13.1 oz)  07/06/24 : 61.2 kg (135 lb)  07/05/24 : 62.1 kg (137 lb)  07/02/24 : 63.1 kg (139 lb 1.8 oz)  07/01/24 : 62.5 kg (137 lb 14.4 oz)   HENT: Negative.     Eyes: Negative.    Respiratory: Negative.     Cardiovascular:  Positive for leg swelling. Negative for chest pain and palpitations.   Gastrointestinal:  Negative for abdominal distention, abdominal pain, blood in stool, constipation, diarrhea, nausea, rectal pain and vomiting.        GI issues resolved, feeling well   Endocrine: Negative for polydipsia, polyphagia and polyuria.   Genitourinary: Negative.    Musculoskeletal:  Positive for back pain and myalgias. Negative for arthralgias, gait problem, neck pain and neck stiffness.   Neurological:  Positive for weakness. Negative for dizziness, tremors, syncope, facial asymmetry, light-headedness and headaches.   Psychiatric/Behavioral: Negative.          ALLERGIES:  Review of patient's allergies indicates:  No Known Allergies    CURRENT PROBLEM LIST:  Patient Active Problem List   Diagnosis    Colitis    Hypertension       CURRENT MEDICATION LIST:    Current Outpatient Medications:     amLODIPine (NORVASC) 10 MG tablet, Take 1 tablet (10 mg total) by mouth once daily., Disp: 30 tablet, Rfl: 0    pantoprazole (PROTONIX) 40 MG tablet,  "Take 1 tablet (40 mg total) by mouth once daily., Disp: 30 tablet, Rfl: 0      Past medical, surgical, family and social histories have been reviewed today.      OBJECTIVE:     Vitals:    07/11/24 0820   BP: (!) 168/70   Pulse: 99   Resp: 18   Temp: 97 °F (36.1 °C)   TempSrc: Tympanic   SpO2: 96%   Weight: 60.7 kg (133 lb 13.1 oz)   Height: 5' 6" (1.676 m)       Physical Exam  Vitals reviewed.   Constitutional:       General: She is not in acute distress.  HENT:      Head: Normocephalic and atraumatic.   Cardiovascular:      Rate and Rhythm: Normal rate and regular rhythm.      Heart sounds: No murmur heard.     No friction rub. No gallop.   Pulmonary:      Effort: Pulmonary effort is normal.      Breath sounds: Normal breath sounds.   Abdominal:      General: Bowel sounds are normal. There is distension (mild but soft).      Palpations: Abdomen is soft.      Tenderness: There is no abdominal tenderness. There is no right CVA tenderness, left CVA tenderness, guarding or rebound.   Musculoskeletal:         General: Tenderness (left lower back w/out edema or rash, full ROM) present. No deformity.      Right lower leg: Edema present.      Left lower leg: Edema present.      Comments: Trace to 1 + edema to both lower legs w/ increased varicosities; no phlebitis noted.   Skin:     Capillary Refill: Capillary refill takes less than 2 seconds.      Findings: Bruising (scattered areas of bruising w/ thin skin to arms) present.   Neurological:      Mental Status: She is alert and oriented to person, place, and time. Mental status is at baseline.   Psychiatric:         Mood and Affect: Mood normal.         Behavior: Behavior normal.         Thought Content: Thought content normal.         Judgment: Judgment normal.         ASSESSMENT:     1. Hospital discharge follow-up  -     Ambulatory referral/consult to Cardiology; Future; Expected date: 07/18/2024  -     Ambulatory referral/consult to Gastroenterology; Future; Expected " date: 07/18/2024  -     CBC Without Differential; Future; Expected date: 07/11/2024  -     Basic Metabolic Panel; Future; Expected date: 07/11/2024    2. Colitis ----- treated, feeling well.  To GI for further evaluation and hosp f/u  -     Ambulatory referral/consult to Gastroenterology; Future; Expected date: 07/18/2024    3. Hypertension, unspecified type ------ on CCB as ordered, to Cardiology for further evaluation.  Having lower extremity swelling with cause unclear at this time ---- inactivity, s/e of amlodipine, increased IVF in hospital, venous or arterial insufficiency or other.  -     Ambulatory referral/consult to Cardiology; Future; Expected date: 07/18/2024  -     Basic Metabolic Panel; Future; Expected date: 07/11/2024    4. Elevated troponin ----- see # 3  -     Ambulatory referral/consult to Cardiology; Future; Expected date: 07/18/2024    5. Stage 3 chronic kidney disease, unspecified whether stage 3a or 3b CKD  -     Basic Metabolic Panel; Future; Expected date: 07/11/2024    6. Anemia, unspecified type  -     CBC Without Differential; Future; Expected date: 07/11/2024    7. Hypokalemia  -     Basic Metabolic Panel; Future; Expected date: 07/11/2024    8. Gastroesophageal reflux disease without esophagitis ----- on PPI with noted improvement of chest/acid issues    9. Acute left-sided low back pain without sciatica ----- for now try topical methods, Tylenol.  Avoid oral NSAID's due to CKD, uncontrolled HTN, edema and elev troponin in hospital.  -     LIDOcaine (LIDODERM) 5 %; Place 1 patch onto the skin daily as needed. Do not leave patch on for > 12 hrs.  Dispense: 15 patch; Refill: 0      PLAN:     Referrals placed.  Medications reviewed and updated as necessary.  Lab today, pending.  RTC as directed and/or prn.          KEYSHAWN Anaya  [facility]

## 2024-07-22 ENCOUNTER — LAB VISIT (OUTPATIENT)
Dept: LAB | Facility: HOSPITAL | Age: 79
End: 2024-07-22
Attending: INTERNAL MEDICINE
Payer: MEDICARE

## 2024-07-22 ENCOUNTER — OFFICE VISIT (OUTPATIENT)
Dept: CARDIOLOGY | Facility: CLINIC | Age: 79
End: 2024-07-22
Payer: MEDICARE

## 2024-07-22 VITALS
OXYGEN SATURATION: 98 % | WEIGHT: 140.19 LBS | BODY MASS INDEX: 22.63 KG/M2 | HEART RATE: 89 BPM | SYSTOLIC BLOOD PRESSURE: 170 MMHG | DIASTOLIC BLOOD PRESSURE: 82 MMHG

## 2024-07-22 DIAGNOSIS — Z09 HOSPITAL DISCHARGE FOLLOW-UP: ICD-10-CM

## 2024-07-22 DIAGNOSIS — R07.9 CHEST PAIN, UNSPECIFIED TYPE: Primary | ICD-10-CM

## 2024-07-22 DIAGNOSIS — I10 HYPERTENSION, UNSPECIFIED TYPE: ICD-10-CM

## 2024-07-22 DIAGNOSIS — R60.0 LOCALIZED EDEMA: ICD-10-CM

## 2024-07-22 DIAGNOSIS — D64.9 ANEMIA, UNSPECIFIED TYPE: ICD-10-CM

## 2024-07-22 DIAGNOSIS — I51.89 DIASTOLIC DYSFUNCTION: ICD-10-CM

## 2024-07-22 DIAGNOSIS — R79.89 ELEVATED TROPONIN: ICD-10-CM

## 2024-07-22 LAB
ALBUMIN SERPL BCP-MCNC: 3.8 G/DL (ref 3.5–5.2)
ALP SERPL-CCNC: 98 U/L (ref 55–135)
ALT SERPL W/O P-5'-P-CCNC: 15 U/L (ref 10–44)
ANION GAP SERPL CALC-SCNC: 11 MMOL/L (ref 8–16)
AST SERPL-CCNC: 26 U/L (ref 10–40)
BILIRUB SERPL-MCNC: 0.5 MG/DL (ref 0.1–1)
BNP SERPL-MCNC: 104 PG/ML (ref 0–99)
BUN SERPL-MCNC: 15 MG/DL (ref 8–23)
CALCIUM SERPL-MCNC: 9.9 MG/DL (ref 8.7–10.5)
CHLORIDE SERPL-SCNC: 104 MMOL/L (ref 95–110)
CO2 SERPL-SCNC: 24 MMOL/L (ref 23–29)
CREAT SERPL-MCNC: 1.2 MG/DL (ref 0.5–1.4)
EST. GFR  (NO RACE VARIABLE): 46 ML/MIN/1.73 M^2
GLUCOSE SERPL-MCNC: 102 MG/DL (ref 70–110)
MAGNESIUM SERPL-MCNC: 1.9 MG/DL (ref 1.6–2.6)
POTASSIUM SERPL-SCNC: 4 MMOL/L (ref 3.5–5.1)
PROT SERPL-MCNC: 8.9 G/DL (ref 6–8.4)
SODIUM SERPL-SCNC: 139 MMOL/L (ref 136–145)

## 2024-07-22 PROCEDURE — 99205 OFFICE O/P NEW HI 60 MIN: CPT | Mod: S$PBB,,, | Performed by: INTERNAL MEDICINE

## 2024-07-22 PROCEDURE — 99999 PR PBB SHADOW E&M-EST. PATIENT-LVL III: CPT | Mod: PBBFAC,,, | Performed by: INTERNAL MEDICINE

## 2024-07-22 PROCEDURE — 99213 OFFICE O/P EST LOW 20 MIN: CPT | Mod: PBBFAC,PO | Performed by: INTERNAL MEDICINE

## 2024-07-22 PROCEDURE — G2211 COMPLEX E/M VISIT ADD ON: HCPCS | Mod: S$PBB,,, | Performed by: INTERNAL MEDICINE

## 2024-07-22 PROCEDURE — 36415 COLL VENOUS BLD VENIPUNCTURE: CPT | Mod: PO | Performed by: INTERNAL MEDICINE

## 2024-07-22 PROCEDURE — 80053 COMPREHEN METABOLIC PANEL: CPT | Performed by: INTERNAL MEDICINE

## 2024-07-22 PROCEDURE — 83880 ASSAY OF NATRIURETIC PEPTIDE: CPT | Performed by: INTERNAL MEDICINE

## 2024-07-22 PROCEDURE — 83735 ASSAY OF MAGNESIUM: CPT | Performed by: INTERNAL MEDICINE

## 2024-07-22 RX ORDER — OLMESARTAN MEDOXOMIL 40 MG/1
40 TABLET ORAL NIGHTLY
Qty: 30 TABLET | Refills: 3 | Status: SHIPPED | OUTPATIENT
Start: 2024-07-22 | End: 2025-07-22

## 2024-07-22 NOTE — PROGRESS NOTES
Subjective:   Patient ID:  Isabel Miles is a 78 y.o. female who presents for cardiac consult of No chief complaint on file.      Referral by: Moose Kruse, Zach  [id number] [id number]     Reason for consult: HTN, elevated Trop      HPI  The patient came in today for cardiac consult of No chief complaint on file.      Isabel Miles is a 78 y.o. female pt with HTN, HFPEF, GERD, anemia, CKD presents for initial CVD eval.       July 2024 HPI:   78 year-old with PMH of HTN and colitis presented to ER 7/5/24 after being seen at urgent care and she had /88. She reports she choked while eating breakfast this am grits and toast (with butter and jelly) and felt like her food was stuck in her chest. She vomited prior to arrival. She was unable to take her medications this am. Per niece at bedside who helped with history, she moved in with her 3 weeks ago from [place] and she says her blood pressure has consistently been SBP  >200.   She was just discharged from the hospital 7/4/24 after being diagnosed with non-infective colitis, sent on Cipro/Flagyl x 7 days. She reports she was able to eat gumbo last night and take her medication without any difficulty. She denies palpitations, chills, fever. She said her stool is starting to be more firm. She complaints of chest discomfort that is reproducible. She said it will go away, but returns if she tried to drink anything.      In ER, labs revealed H&H 11.6/35.7, K 2.8, CO 2 19, Mg 1.5, Cr 1.5, troponin 0.046. CXR no acute finding. She was given 2 g Mg rider and K rider 10 mEq with oral potassium, which patient is unable to finish because it makes her chest hurt. She was given IV labetolol with improvement in BP. [facility] [place] for to admit for dysphagia and electrolyte derangement.         Hospital Course:   78 year-old female admitted with hypertensive urgency and says she choked on breakfast and felt like her food got stuck in her chest  "and she vomited. She had some chest tenderness and reproducible pain after. Troponins slightly elevated on admission, trend was flat.  Started on Protonix on admission.   ST evaluated patient and recommended an easy to chew diet IDDSI level 7, thin liquids; and recommended GI consult for evaluation.   Patient was able to tolerated her diet without any complaints.      Referral sent to GI on discharge for outpatient follow up. Discussed with niece at bedside. Will send with a prescription for Protonix as well, possibly reflux component due to "chest discomfort" that occurs after she eats.   Blood pressure is controlled, she was able to tolerate oral medications prior to discharge.    Her diarrhea is improving, recommended to continue her antibiotic course of Cipro and Flagyl until completed.        7/22/24 - hosp follow up - initial CV visit    Troponin I 0.000 - 0.026 ng/mL 0.044 High  0.047 High  CM 0.046 High  CM     ECHO 7/2023 with normal bi V function, grade 1 DD, mild MR, PASP 34 mmHg.   She has more edema now since being on Norvasc.   Pt moved from [place] recently has not been to PCP.     FH - CVD mostly in 80s       Results for orders placed during the hospital encounter of 07/05/24    Echo    Interpretation Summary    Left Ventricle: The left ventricle is normal in size. Normal wall thickness. There is normal systolic function with a visually estimated ejection fraction of 60 - 65%. Grade I diastolic dysfunction.    Right Ventricle: Normal right ventricular cavity size. Wall thickness is normal. Systolic function is normal.    Tricuspid Valve: There is mild regurgitation.    Pulmonary Artery: The estimated pulmonary artery systolic pressure is 34 mmHg.    IVC/SVC: Normal venous pressure at 3 mmHg.      No results found for this or any previous visit.      No results found for this or any previous visit.      No cardiac monitor results found for the past 12 months         Past Medical History: "   Diagnosis Date    Hypertension        Past Surgical History:   Procedure Laterality Date    CHOLECYSTECTOMY         Social History     Tobacco Use    Smoking status: Never     Passive exposure: Current (niece smokes outside)    Smokeless tobacco: Never   Substance Use Topics    Alcohol use: Never    Drug use: Never       No family history on file.    Patient's Medications   New Prescriptions    OLMESARTAN (BENICAR) 40 MG TABLET    Take 1 tablet (40 mg total) by mouth every evening.   Previous Medications    LIDOCAINE (LIDODERM) 5 %    Place 1 patch onto the skin daily as needed. Do not leave patch on for > 12 hrs.    PANTOPRAZOLE (PROTONIX) 40 MG TABLET    Take 1 tablet (40 mg total) by mouth once daily.   Modified Medications    No medications on file   Discontinued Medications    AMLODIPINE (NORVASC) 10 MG TABLET    Take 1 tablet (10 mg total) by mouth once daily.       Review of Systems   Constitutional: Negative.    HENT: Negative.     Eyes: Negative.    Respiratory: Negative.     Cardiovascular:  Positive for chest pain and leg swelling.   Gastrointestinal:  Positive for heartburn.   Genitourinary: Negative.    Musculoskeletal:  Positive for back pain and joint pain.   Skin: Negative.    Neurological: Negative.    Endo/Heme/Allergies: Negative.    Psychiatric/Behavioral: Negative.     All 12 systems otherwise negative.      Wt Readings from Last 3 Encounters:   07/22/24 63.6 kg (140 lb 3.4 oz)   07/11/24 60.7 kg (133 lb 13.1 oz)   07/06/24 61.2 kg (135 lb)     Temp Readings from Last 3 Encounters:   07/11/24 97 °F (36.1 °C) (Tympanic)   07/06/24 98.1 °F (36.7 °C) (Oral)   07/05/24 98 °F (36.7 °C) (Oral)     BP Readings from Last 3 Encounters:   07/22/24 (!) 170/82   07/11/24 (!) 168/70   07/06/24 (!) 140/60     Pulse Readings from Last 3 Encounters:   07/22/24 89   07/11/24 99   07/06/24 87       BP (!) 170/82 (BP Location: Left arm, Patient Position: Sitting, BP Method: Medium (Manual))   Pulse 89   Wt  63.6 kg (140 lb 3.4 oz)   SpO2 98%   BMI 22.63 kg/m²     Objective:   Physical Exam  Vitals and nursing note reviewed.   Constitutional:       General: She is not in acute distress.     Appearance: She is well-developed. She is not diaphoretic.   HENT:      Head: Normocephalic and atraumatic.      Nose: Nose normal.   Eyes:      General: No scleral icterus.     Conjunctiva/sclera: Conjunctivae normal.   Neck:      Thyroid: No thyromegaly.      Vascular: No JVD.   Cardiovascular:      Rate and Rhythm: Normal rate and regular rhythm.      Heart sounds: S1 normal and S2 normal. Murmur heard.      No friction rub. No gallop. No S3 or S4 sounds.   Pulmonary:      Effort: Pulmonary effort is normal. No respiratory distress.      Breath sounds: Normal breath sounds. No stridor. No wheezing or rales.   Chest:      Chest wall: No tenderness.   Abdominal:      General: Bowel sounds are normal. There is no distension.      Palpations: Abdomen is soft. There is no mass.      Tenderness: There is no abdominal tenderness. There is no rebound.   Genitourinary:     Comments: Deferred  Musculoskeletal:         General: No tenderness or deformity. Normal range of motion.      Cervical back: Normal range of motion and neck supple.      Right lower leg: Edema present.      Left lower leg: Edema present.   Lymphadenopathy:      Cervical: No cervical adenopathy.   Skin:     General: Skin is warm and dry.      Coloration: Skin is not pale.      Findings: No erythema or rash.   Neurological:      Mental Status: She is alert and oriented to person, place, and time.      Motor: No abnormal muscle tone.      Coordination: Coordination normal.   Psychiatric:         Behavior: Behavior normal.         Thought Content: Thought content normal.         Judgment: Judgment normal.         Lab Results   Component Value Date     07/11/2024    K 3.6 07/11/2024     07/11/2024    CO2 23 07/11/2024    BUN 10 07/11/2024    CREATININE 1.2  "07/11/2024    GLU 98 07/11/2024    MG 2.1 07/06/2024    AST 31 07/05/2024    ALT 15 07/05/2024    ALBUMIN 3.5 07/05/2024    PROT 8.0 07/05/2024    BILITOT 0.5 07/05/2024    WBC 7.57 07/11/2024    HGB 11.5 (L) 07/11/2024    HCT 36.5 (L) 07/11/2024    MCV 96 07/11/2024     07/11/2024    TSH 3.440 07/02/2024         No results found for: "BNP", "INR"       Assessment:      1. Chest pain, unspecified type    2. Hospital discharge follow-up    3. Hypertension, unspecified type    4. Elevated troponin    5. Anemia, unspecified type    6. Diastolic dysfunction    7. Localized edema        Plan:     Chest pain, elevated trop  - ECHO 7/2023 with normal bi V function, grade 1 DD, mild MR, PASP 34 mmHg.   - likely sec to demand ischemia  - order pharm nuclear stress test, pt cannot walk on treadmill due to back pain    2. HTN with grade 1 DD with more edema  - titrate meds - DC Norvasc   - cont low salt diet  - order LE u/s venous     3. GERD sx   - cont PPI and f/u GI    4. CKD, anemia  - cont to monitor     Visit today included increased complexity associated with the care of the episodic problem chest pain addressed and managing the longitudinal care of the patient due to the serious and/or complex managed problem(s) .      Thank you for allowing me to participate in this patient's care. Please do not hesitate to contact me with any questions or concerns. Consult note has been forwarded to the referral physician.     "

## 2024-07-23 ENCOUNTER — TELEPHONE (OUTPATIENT)
Dept: CARDIOLOGY | Facility: CLINIC | Age: 79
End: 2024-07-23
Payer: MEDICARE

## 2024-07-23 ENCOUNTER — PATIENT MESSAGE (OUTPATIENT)
Dept: CARDIOLOGY | Facility: HOSPITAL | Age: 79
End: 2024-07-23
Payer: MEDICARE

## 2024-07-23 RX ORDER — HYDROCHLOROTHIAZIDE 25 MG/1
25 TABLET ORAL DAILY
Qty: 30 TABLET | Refills: 3 | Status: SHIPPED | OUTPATIENT
Start: 2024-07-23 | End: 2025-07-23

## 2024-07-23 RX ORDER — LANOLIN ALCOHOL/MO/W.PET/CERES
400 CREAM (GRAM) TOPICAL DAILY
Qty: 30 TABLET | Refills: 3 | Status: SHIPPED | OUTPATIENT
Start: 2024-07-23

## 2024-07-23 RX ORDER — POTASSIUM CHLORIDE 750 MG/1
10 TABLET, EXTENDED RELEASE ORAL DAILY
Qty: 30 TABLET | Refills: 3 | Status: SHIPPED | OUTPATIENT
Start: 2024-07-23

## 2024-07-23 NOTE — TELEPHONE ENCOUNTER
Contacted patient; Patient received and understood results with no questions or concerns.      ----- Message from Bala Fried MD sent at 7/23/2024 12:27 PM CDT -----  Please contact the patient and let them know that their results reveal mildly elevated BNP due to extra fluid - I am starting HCTZ 25 mg with potassium and magnesium - needsto monitor BP and weights and continue salt diet. Follow up with me as scheduled or sooner if needed. Thanks

## 2024-07-29 NOTE — PHYSICIAN QUERY
Please clarify/confirm the emergency medicine diagnosis of KATHRIN:    Diagnosis ruled in, but it resolved prior to my assessment of the patient

## 2024-07-30 ENCOUNTER — TELEPHONE (OUTPATIENT)
Dept: CARDIOLOGY | Facility: HOSPITAL | Age: 79
End: 2024-07-30
Payer: MEDICARE

## 2024-08-26 ENCOUNTER — OFFICE VISIT (OUTPATIENT)
Dept: CARDIOLOGY | Facility: CLINIC | Age: 79
End: 2024-08-26
Payer: MEDICARE

## 2024-08-26 VITALS
BODY MASS INDEX: 24.1 KG/M2 | HEIGHT: 66 IN | WEIGHT: 149.94 LBS | SYSTOLIC BLOOD PRESSURE: 158 MMHG | HEART RATE: 89 BPM | OXYGEN SATURATION: 100 % | DIASTOLIC BLOOD PRESSURE: 82 MMHG

## 2024-08-26 DIAGNOSIS — R79.89 ELEVATED TROPONIN: ICD-10-CM

## 2024-08-26 DIAGNOSIS — D64.9 ANEMIA, UNSPECIFIED TYPE: ICD-10-CM

## 2024-08-26 DIAGNOSIS — R07.9 CHEST PAIN, UNSPECIFIED TYPE: Primary | ICD-10-CM

## 2024-08-26 DIAGNOSIS — I10 HYPERTENSION, UNSPECIFIED TYPE: ICD-10-CM

## 2024-08-26 DIAGNOSIS — R60.0 LOCALIZED EDEMA: ICD-10-CM

## 2024-08-26 DIAGNOSIS — I50.33 ACUTE ON CHRONIC DIASTOLIC CONGESTIVE HEART FAILURE: ICD-10-CM

## 2024-08-26 DIAGNOSIS — I51.89 DIASTOLIC DYSFUNCTION: ICD-10-CM

## 2024-08-26 PROCEDURE — 99999 PR PBB SHADOW E&M-EST. PATIENT-LVL III: CPT | Mod: PBBFAC,,, | Performed by: INTERNAL MEDICINE

## 2024-08-26 PROCEDURE — 99214 OFFICE O/P EST MOD 30 MIN: CPT | Mod: S$PBB,,, | Performed by: INTERNAL MEDICINE

## 2024-08-26 PROCEDURE — G2211 COMPLEX E/M VISIT ADD ON: HCPCS | Mod: S$PBB,,, | Performed by: INTERNAL MEDICINE

## 2024-08-26 PROCEDURE — 99213 OFFICE O/P EST LOW 20 MIN: CPT | Mod: PBBFAC,PO | Performed by: INTERNAL MEDICINE

## 2024-08-26 RX ORDER — LANOLIN ALCOHOL/MO/W.PET/CERES
400 CREAM (GRAM) TOPICAL DAILY
Qty: 90 TABLET | Refills: 1 | Status: SHIPPED | OUTPATIENT
Start: 2024-08-26

## 2024-08-26 RX ORDER — POTASSIUM CHLORIDE 600 MG/1
8 TABLET, FILM COATED, EXTENDED RELEASE ORAL 2 TIMES DAILY
Qty: 90 TABLET | Refills: 1 | Status: SHIPPED | OUTPATIENT
Start: 2024-08-26

## 2024-08-26 RX ORDER — FUROSEMIDE 20 MG/1
20 TABLET ORAL 2 TIMES DAILY
Qty: 60 TABLET | Refills: 3 | Status: SHIPPED | OUTPATIENT
Start: 2024-08-26 | End: 2024-08-29 | Stop reason: SDUPTHER

## 2024-08-26 NOTE — PROGRESS NOTES
Subjective:   Patient ID:  Isabel Miles is a 78 y.o. female who presents for cardiac consult of Follow-up (Fluid )      Referral by: No referring provider defined for this encounter.     Reason for consult: HTN, elevated Trop      HPI  The patient came in today for cardiac consult of Follow-up (Fluid )      Isabel Miles is a 78 y.o. female pt with HTN, HFPEF, GERD, anemia, CKD presents for follow up CV Eval.       July 2024 HPI:   78 year-old with PMH of HTN and colitis presented to ER 7/5/24 after being seen at urgent care and she had /88. She reports she choked while eating breakfast this am grits and toast (with butter and jelly) and felt like her food was stuck in her chest. She vomited prior to arrival. She was unable to take her medications this am. Per niece at bedside who helped with history, she moved in with her 3 weeks ago from [place] and she says her blood pressure has consistently been SBP  >200.   She was just discharged from the hospital 7/4/24 after being diagnosed with non-infective colitis, sent on Cipro/Flagyl x 7 days. She reports she was able to eat gumbo last night and take her medication without any difficulty. She denies palpitations, chills, fever. She said her stool is starting to be more firm. She complaints of chest discomfort that is reproducible. She said it will go away, but returns if she tried to drink anything.      In ER, labs revealed H&H 11.6/35.7, K 2.8, CO 2 19, Mg 1.5, Cr 1.5, troponin 0.046. CXR no acute finding. She was given 2 g Mg rider and K rider 10 mEq with oral potassium, which patient is unable to finish because it makes her chest hurt. She was given IV labetolol with improvement in BP. [facility] was consulted for to admit for dysphagia and electrolyte derangement.        Hospital Course:   78 year-old female admitted with hypertensive urgency and says she choked on breakfast and felt like her food got stuck in her chest and she vomited. She had some  "chest tenderness and reproducible pain after. Troponins slightly elevated on admission, trend was flat.  Started on Protonix on admission.   ST evaluated patient and recommended an easy to chew diet IDDSI level 7, thin liquids; and recommended GI consult for evaluation.   Patient was able to tolerated her diet without any complaints.      Referral sent to GI on discharge for outpatient follow up. Discussed with niece at bedside. Will send with a prescription for Protonix as well, possibly reflux component due to "chest discomfort" that occurs after she eats.   Blood pressure is controlled, she was able to tolerate oral medications prior to discharge.    Her diarrhea is improving, recommended to continue her antibiotic course of Cipro and Flagyl until completed.        7/22/24 - hosp follow up - initial CV visit    Troponin I 0.000 - 0.026 ng/mL 0.044 High  0.047 High  CM 0.046 High  CM     ECHO 7/2023 with normal bi V function, grade 1 DD, mild MR, PASP 34 mmHg.   She has more edema now since being on Norvasc.   Pt moved from [place] recently has not been to PCP.       8/26/24    BNP (pg/mL)   Date Value   07/22/2024 104 (H)      Started on HCTZ at last visit.   BP elevated 158/82. HR 89. BMI 24 - 149 lbs  (was 133 lbs 1 months ago)  Has worsening edema in feet.     FH - CVD mostly in 80s       Results for orders placed during the hospital encounter of 07/05/24    Echo    Interpretation Summary    Left Ventricle: The left ventricle is normal in size. Normal wall thickness. There is normal systolic function with a visually estimated ejection fraction of 60 - 65%. Grade I diastolic dysfunction.    Right Ventricle: Normal right ventricular cavity size. Wall thickness is normal. Systolic function is normal.    Tricuspid Valve: There is mild regurgitation.    Pulmonary Artery: The estimated pulmonary artery systolic pressure is 34 mmHg.    IVC/SVC: Normal venous pressure at 3 mmHg.      No results found for this or " any previous visit.      No results found for this or any previous visit.      No cardiac monitor results found for the past 12 months         Past Medical History:   Diagnosis Date    Hypertension        Past Surgical History:   Procedure Laterality Date    CHOLECYSTECTOMY         Social History     Tobacco Use    Smoking status: Never     Passive exposure: Current (niece smokes outside)    Smokeless tobacco: Never   Substance Use Topics    Alcohol use: Never    Drug use: Never       No family history on file.    Patient's Medications   New Prescriptions    FUROSEMIDE (LASIX) 20 MG TABLET    Take 1 tablet (20 mg total) by mouth 2 (two) times daily. Once swelling resolves, can take it daily or as needed. Do not take if BP is below 110/70    POTASSIUM CHLORIDE (KLOR-CON) 8 MEQ TBSR    Take 1 tablet (8 mEq total) by mouth 2 (two) times daily. Take with fluid pills   Previous Medications    HYDROCHLOROTHIAZIDE (HYDRODIURIL) 25 MG TABLET    Take 1 tablet (25 mg total) by mouth once daily. Do not take if BP is below 110/70    LIDOCAINE (LIDODERM) 5 %    Place 1 patch onto the skin daily as needed. Do not leave patch on for > 12 hrs.    OLMESARTAN (BENICAR) 40 MG TABLET    Take 1 tablet (40 mg total) by mouth every evening.    PANTOPRAZOLE (PROTONIX) 40 MG TABLET    Take 1 tablet (40 mg total) by mouth once daily.    POTASSIUM CHLORIDE (KLOR-CON) 10 MEQ TBSR    Take 1 tablet (10 mEq total) by mouth once daily. Take with fluid pill   Modified Medications    Modified Medication Previous Medication    MAGNESIUM OXIDE (MAG-OX) 400 MG (241.3 MG MAGNESIUM) TABLET magnesium oxide (MAG-OX) 400 mg (241.3 mg magnesium) tablet       Take 1 tablet (400 mg total) by mouth once daily.    Take 1 tablet (400 mg total) by mouth once daily.   Discontinued Medications    No medications on file       Review of Systems   Constitutional: Negative.    HENT: Negative.     Eyes: Negative.    Respiratory: Negative.     Cardiovascular:  Positive  "for chest pain and leg swelling.   Gastrointestinal:  Positive for heartburn.   Genitourinary: Negative.    Musculoskeletal:  Positive for back pain and joint pain.   Skin: Negative.    Neurological: Negative.    Endo/Heme/Allergies: Negative.    Psychiatric/Behavioral: Negative.     All 12 systems otherwise negative.      Wt Readings from Last 3 Encounters:   08/26/24 68 kg (149 lb 14.6 oz)   07/22/24 63.6 kg (140 lb 3.4 oz)   07/11/24 60.7 kg (133 lb 13.1 oz)     Temp Readings from Last 3 Encounters:   07/11/24 97 °F (36.1 °C) (Tympanic)   07/06/24 98.1 °F (36.7 °C) (Oral)   07/05/24 98 °F (36.7 °C) (Oral)     BP Readings from Last 3 Encounters:   08/26/24 (!) 158/82   07/22/24 (!) 170/82   07/11/24 (!) 168/70     Pulse Readings from Last 3 Encounters:   08/26/24 89   07/22/24 89   07/11/24 99       BP (!) 158/82   Pulse 89   Ht 5' 6" (1.676 m)   Wt 68 kg (149 lb 14.6 oz)   SpO2 100%   BMI 24.20 kg/m²     Objective:   Physical Exam  Vitals and nursing note reviewed.   Constitutional:       General: She is not in acute distress.     Appearance: She is well-developed. She is not diaphoretic.   HENT:      Head: Normocephalic and atraumatic.      Nose: Nose normal.   Eyes:      General: No scleral icterus.     Conjunctiva/sclera: Conjunctivae normal.   Neck:      Thyroid: No thyromegaly.      Vascular: No JVD.   Cardiovascular:      Rate and Rhythm: Normal rate and regular rhythm.      Heart sounds: S1 normal and S2 normal. Murmur heard.      No friction rub. No gallop. No S3 or S4 sounds.   Pulmonary:      Effort: Pulmonary effort is normal. No respiratory distress.      Breath sounds: Normal breath sounds. No stridor. No wheezing or rales.   Chest:      Chest wall: No tenderness.   Abdominal:      General: Bowel sounds are normal. There is no distension.      Palpations: Abdomen is soft. There is no mass.      Tenderness: There is no abdominal tenderness. There is no rebound.   Genitourinary:     Comments: " Deferred  Musculoskeletal:         General: No tenderness or deformity. Normal range of motion.      Cervical back: Normal range of motion and neck supple.      Right lower leg: Edema present.      Left lower leg: Edema present.   Lymphadenopathy:      Cervical: No cervical adenopathy.   Skin:     General: Skin is warm and dry.      Coloration: Skin is not pale.      Findings: No erythema or rash.   Neurological:      Mental Status: She is alert and oriented to person, place, and time.      Motor: No abnormal muscle tone.      Coordination: Coordination normal.   Psychiatric:         Behavior: Behavior normal.         Thought Content: Thought content normal.         Judgment: Judgment normal.         Lab Results   Component Value Date     07/22/2024    K 4.0 07/22/2024     07/22/2024    CO2 24 07/22/2024    BUN 15 07/22/2024    CREATININE 1.2 07/22/2024     07/22/2024    MG 1.9 07/22/2024    AST 26 07/22/2024    ALT 15 07/22/2024    ALBUMIN 3.8 07/22/2024    PROT 8.9 (H) 07/22/2024    BILITOT 0.5 07/22/2024    WBC 7.57 07/11/2024    HGB 11.5 (L) 07/11/2024    HCT 36.5 (L) 07/11/2024    MCV 96 07/11/2024     07/11/2024    TSH 3.440 07/02/2024     (H) 07/22/2024         BNP (pg/mL)   Date Value   07/22/2024 104 (H)          Assessment:      1. Chest pain, unspecified type    2. Elevated troponin    3. Hypertension, unspecified type    4. Diastolic dysfunction    5. Anemia, unspecified type    6. Localized edema    7. Acute on chronic diastolic congestive heart failure          Plan:     Chest pain, elevated trop  - ECHO 7/2023 with normal bi V function, grade 1 DD, mild MR, PASP 34 mmHg.   - likely sec to demand ischemia  - order pharm nuclear stress test, pt cannot walk on treadmill due to back pain - does not want it now     2. HTN with grade 1 DD with more edema; last , acute CHF exac with > 10 lbs weight gain  - titrate meds - DC Norvasc   - cont diuretics   - cont low salt  diet  - order LE u/s venous  - pending   - order labs, add Lasix 20mg BID    3. GERD sx   - cont PPI and f/u GI    4. CKD, anemia  - cont to monitor     Visit today included increased complexity associated with the care of the episodic problem chest pain addressed and managing the longitudinal care of the patient due to the serious and/or complex managed problem(s) .      Thank you for allowing me to participate in this patient's care. Please do not hesitate to contact me with any questions or concerns. Consult note has been forwarded to the referral physician.

## 2024-08-29 ENCOUNTER — PATIENT MESSAGE (OUTPATIENT)
Dept: CARDIOLOGY | Facility: CLINIC | Age: 79
End: 2024-08-29
Payer: MEDICARE

## 2024-08-29 RX ORDER — FUROSEMIDE 40 MG/1
40 TABLET ORAL 2 TIMES DAILY
Qty: 60 TABLET | Refills: 3 | Status: SHIPPED | OUTPATIENT
Start: 2024-08-29 | End: 2025-08-29

## 2024-09-06 ENCOUNTER — HOSPITAL ENCOUNTER (OUTPATIENT)
Dept: CARDIOLOGY | Facility: HOSPITAL | Age: 79
Discharge: HOME | End: 2024-09-06
Attending: INTERNAL MEDICINE
Payer: MEDICARE

## 2024-09-06 DIAGNOSIS — R60.0 LOCALIZED EDEMA: ICD-10-CM

## 2024-09-09 ENCOUNTER — TELEPHONE (OUTPATIENT)
Dept: CARDIOLOGY | Facility: CLINIC | Age: 79
End: 2024-09-09
Payer: MEDICARE

## 2024-09-09 NOTE — TELEPHONE ENCOUNTER
Called and spoke to pt claudia. She stated traffic is horrible and they will make it after 5pm. She rescheduled pt appointment to next Monday 09/16/24.     ----- Message from Emelyn Ortio sent at 9/9/2024  4:20 PM CDT -----  Contact: Autumn Mae  [phone]  Would like to receive medical advice.    Would they like a call back or a response via [facility]:  call back     Additional information:  Pt's claudia is calling because they are running about 20 minutes late for pt's appt.  She is asking if there is a later time available.  Please call to advise

## 2024-09-11 ENCOUNTER — PATIENT MESSAGE (OUTPATIENT)
Dept: CARDIOLOGY | Facility: HOSPITAL | Age: 79
End: 2024-09-11
Payer: MEDICARE

## 2024-09-16 ENCOUNTER — TELEPHONE (OUTPATIENT)
Dept: CARDIOLOGY | Facility: CLINIC | Age: 79
End: 2024-09-16
Payer: MEDICARE

## 2024-09-16 ENCOUNTER — OFFICE VISIT (OUTPATIENT)
Dept: CARDIOLOGY | Facility: CLINIC | Age: 79
End: 2024-09-16
Payer: MEDICARE

## 2024-09-16 ENCOUNTER — LAB VISIT (OUTPATIENT)
Dept: LAB | Facility: HOSPITAL | Age: 79
End: 2024-09-16
Attending: INTERNAL MEDICINE
Payer: MEDICARE

## 2024-09-16 VITALS
HEART RATE: 80 BPM | DIASTOLIC BLOOD PRESSURE: 64 MMHG | HEIGHT: 66 IN | BODY MASS INDEX: 24.2 KG/M2 | SYSTOLIC BLOOD PRESSURE: 148 MMHG | OXYGEN SATURATION: 99 %

## 2024-09-16 DIAGNOSIS — I50.33 ACUTE ON CHRONIC DIASTOLIC CONGESTIVE HEART FAILURE: Primary | ICD-10-CM

## 2024-09-16 DIAGNOSIS — R79.89 ELEVATED TROPONIN: ICD-10-CM

## 2024-09-16 DIAGNOSIS — R07.9 CHEST PAIN, UNSPECIFIED TYPE: ICD-10-CM

## 2024-09-16 DIAGNOSIS — I50.32 CHRONIC HEART FAILURE WITH PRESERVED EJECTION FRACTION: ICD-10-CM

## 2024-09-16 DIAGNOSIS — R60.0 LOCALIZED EDEMA: ICD-10-CM

## 2024-09-16 DIAGNOSIS — M54.9 BACK PAIN, UNSPECIFIED BACK LOCATION, UNSPECIFIED BACK PAIN LATERALITY, UNSPECIFIED CHRONICITY: ICD-10-CM

## 2024-09-16 DIAGNOSIS — D64.9 ANEMIA, UNSPECIFIED TYPE: ICD-10-CM

## 2024-09-16 DIAGNOSIS — I50.33 ACUTE ON CHRONIC DIASTOLIC CONGESTIVE HEART FAILURE: ICD-10-CM

## 2024-09-16 DIAGNOSIS — I10 PRIMARY HYPERTENSION: ICD-10-CM

## 2024-09-16 DIAGNOSIS — I51.89 DIASTOLIC DYSFUNCTION: ICD-10-CM

## 2024-09-16 LAB
ANION GAP SERPL CALC-SCNC: 10 MMOL/L (ref 8–16)
BNP SERPL-MCNC: 104 PG/ML (ref 0–99)
BUN SERPL-MCNC: 57 MG/DL (ref 8–23)
CALCIUM SERPL-MCNC: 9.8 MG/DL (ref 8.7–10.5)
CHLORIDE SERPL-SCNC: 105 MMOL/L (ref 95–110)
CO2 SERPL-SCNC: 17 MMOL/L (ref 23–29)
CREAT SERPL-MCNC: 2.6 MG/DL (ref 0.5–1.4)
EST. GFR  (NO RACE VARIABLE): 18 ML/MIN/1.73 M^2
GLUCOSE SERPL-MCNC: 107 MG/DL (ref 70–110)
MAGNESIUM SERPL-MCNC: 2.3 MG/DL (ref 1.6–2.6)
POTASSIUM SERPL-SCNC: 5.6 MMOL/L (ref 3.5–5.1)
SODIUM SERPL-SCNC: 132 MMOL/L (ref 136–145)

## 2024-09-16 PROCEDURE — 80048 BASIC METABOLIC PNL TOTAL CA: CPT | Performed by: INTERNAL MEDICINE

## 2024-09-16 PROCEDURE — 99999 PR PBB SHADOW E&M-EST. PATIENT-LVL III: CPT | Mod: PBBFAC,,, | Performed by: INTERNAL MEDICINE

## 2024-09-16 PROCEDURE — 99213 OFFICE O/P EST LOW 20 MIN: CPT | Mod: PBBFAC,PO | Performed by: INTERNAL MEDICINE

## 2024-09-16 PROCEDURE — 83880 ASSAY OF NATRIURETIC PEPTIDE: CPT | Performed by: INTERNAL MEDICINE

## 2024-09-16 PROCEDURE — 36415 COLL VENOUS BLD VENIPUNCTURE: CPT | Mod: PO | Performed by: INTERNAL MEDICINE

## 2024-09-16 PROCEDURE — 83735 ASSAY OF MAGNESIUM: CPT | Performed by: INTERNAL MEDICINE

## 2024-09-16 PROCEDURE — 99214 OFFICE O/P EST MOD 30 MIN: CPT | Mod: S$PBB,,, | Performed by: INTERNAL MEDICINE

## 2024-09-16 PROCEDURE — G2211 COMPLEX E/M VISIT ADD ON: HCPCS | Mod: S$PBB,,, | Performed by: INTERNAL MEDICINE

## 2024-09-16 NOTE — TELEPHONE ENCOUNTER
Pt and Niece were  contacted about results:     Contact: Autumn Lazaro @[phone]  OK just reviewed the labs - she is significantly dehydrated - recommend increase water intake all day- avoid HCTZ, Lasix and Benicar next few days; likely doesn't need any further lasix or HCTZ in future, needs to stop taking potassium, I have repeat labs ordered for tomorrow. If doesn't get labs tomorrow or misses it then she needs to go to the ER. If the labs still look bad tomorrow or worsens then I recommend ER eval. Thanks    Labs are scheduled for tomorrow. Appt confirmed    Pt verbalized understanding with no questions or concerns.      ----- Message from Bala Fried MD sent at 9/16/2024  3:21 PM CDT -----  Regarding: RE: Lab results from today  Contact: Autumn Lazaro @ [phone]  OK just reviewed the labs - she is significantly dehydrated - recommend increase water intake all day- avoid HCTZ, Lasix and Benicar next few days; likely doesn't need any further lasix or HCTZ in future, needs to stop taking potassium, I have repeat labs ordered for tomorrow. If doesn't get labs tomorrow or misses it then she needs to go to the ER. If the labs still look bad tomorrow or worsens then I recommend ER eval. Thanks  ----- Message -----  From: Tori Freire LPN  Sent: 9/16/2024   2:53 PM CDT  To: Bala Fried MD  Subject: Lab results from today                           Pt's niece is concerned about the lab results that were drawn today ( BNP /BMP) . [facility]. Would like to be advised if the pt will need to go to the ER.    Tori  ----- Message -----  From: Carmen Yusuf  Sent: 9/16/2024   2:35 PM CDT  To: [facility]ck[facility] Staff    [email or web address]     Patient is calling for Medical Advice regarding:Niece is worried about lab results . She need to know if need to go to ER     How long has patient had these symptoms:    Pharmacy name and phone#:    Patient wants a call back or thru [facility]:call      Comments:    Please advise patient replies from provider may take up to 48 hours.

## 2024-09-16 NOTE — PROGRESS NOTES
Subjective:   Patient ID:  Isabel Miles is a 78 y.o. female who presents for cardiac consult of No chief complaint on file.      Referral by: No referring provider defined for this encounter.     Reason for consult: HTN, elevated Trop      HPI  The patient came in today for cardiac consult of No chief complaint on file.      Isabel Miles is a 78 y.o. female pt with HTN, HFPEF, GERD, anemia, CKD presents for follow up CV Eval.        7/22/24 - hosp follow up - initial CV visit    Troponin I 0.000 - 0.026 ng/mL 0.044 High  0.047 High  CM 0.046 High  CM     ECHO 7/2023 with normal bi V function, grade 1 DD, mild MR, PASP 34 mmHg.   She has more edema now since being on Norvasc.   Pt moved from [place] recently has not been to PCP.     8/26/24    BNP (pg/mL)   Date Value   07/22/2024 104 (H)      Started on HCTZ at last visit.   BP elevated 158/82. HR 89. BMI 24 - 149 lbs  (was 133 lbs 1 months ago)  Has worsening edema in feet.     9/16/24  Repeat labs and LE u/s not completed after last visit.   From pt- Isabel is still retaining a lot of fluid in the upper right leg, and she is now having a right below her breast. Is this anything to be concerned with?   Told pt - double the lasix to 40mg twice a day, need to double up potassium also; if the swelling continues to get worse over next 1-2 days I suggest ER eval and admission for IV lasix and also to rule out a blood clot if swelling is getting worse. Follow up with me next week early or week after need to repeat labs also.     BP mildly elevated 148/60. HR 80. BMI 24 - 149 lbs   She has more pain, back pain and leg pain could not tolerate the u/s  She her swelling has improved, now off lasix.           Results for orders placed during the hospital encounter of 07/05/24    Echo    Interpretation Summary    Left Ventricle: The left ventricle is normal in size. Normal wall thickness. There is normal systolic function with a visually estimated ejection fraction  of 60 - 65%. Grade I diastolic dysfunction.    Right Ventricle: Normal right ventricular cavity size. Wall thickness is normal. Systolic function is normal.    Tricuspid Valve: There is mild regurgitation.    Pulmonary Artery: The estimated pulmonary artery systolic pressure is 34 mmHg.    IVC/SVC: Normal venous pressure at 3 mmHg.      No results found for this or any previous visit.      No results found for this or any previous visit.      No cardiac monitor results found for the past 12 months         Past Medical History:   Diagnosis Date    Hypertension        Past Surgical History:   Procedure Laterality Date    CHOLECYSTECTOMY         Social History     Tobacco Use    Smoking status: Never     Passive exposure: Current (niece smokes outside)    Smokeless tobacco: Never   Substance Use Topics    Alcohol use: Never    Drug use: Never       No family history on file.    Patient's Medications   New Prescriptions    No medications on file   Previous Medications    FUROSEMIDE (LASIX) 40 MG TABLET    Take 1 tablet (40 mg total) by mouth 2 (two) times daily. Once swelling resolves, can take it daily or as needed. Do not take if BP is below 110/70    HYDROCHLOROTHIAZIDE (HYDRODIURIL) 25 MG TABLET    Take 1 tablet (25 mg total) by mouth once daily. Do not take if BP is below 110/70    LIDOCAINE (LIDODERM) 5 %    Place 1 patch onto the skin daily as needed. Do not leave patch on for > 12 hrs.    MAGNESIUM OXIDE (MAG-OX) 400 MG (241.3 MG MAGNESIUM) TABLET    Take 1 tablet (400 mg total) by mouth once daily.    OLMESARTAN (BENICAR) 40 MG TABLET    Take 1 tablet (40 mg total) by mouth every evening.    PANTOPRAZOLE (PROTONIX) 40 MG TABLET    Take 1 tablet (40 mg total) by mouth once daily.    POTASSIUM CHLORIDE (KLOR-CON) 8 MEQ TBSR    Take 1 tablet (8 mEq total) by mouth 2 (two) times daily. Take with fluid pills   Modified Medications    No medications on file   Discontinued Medications    No medications on file  "      Review of Systems   Constitutional: Negative.    HENT: Negative.     Eyes: Negative.    Respiratory: Negative.     Cardiovascular:  Positive for chest pain and leg swelling.   Gastrointestinal:  Positive for heartburn.   Genitourinary: Negative.    Musculoskeletal:  Positive for back pain and joint pain.   Skin: Negative.    Neurological: Negative.    Endo/Heme/Allergies: Negative.    Psychiatric/Behavioral: Negative.     All 12 systems otherwise negative.      Wt Readings from Last 3 Encounters:   08/26/24 68 kg (149 lb 14.6 oz)   07/22/24 63.6 kg (140 lb 3.4 oz)   07/11/24 60.7 kg (133 lb 13.1 oz)     Temp Readings from Last 3 Encounters:   07/11/24 97 °F (36.1 °C) (Tympanic)   07/06/24 98.1 °F (36.7 °C) (Oral)   07/05/24 98 °F (36.7 °C) (Oral)     BP Readings from Last 3 Encounters:   09/16/24 (!) 148/64   08/26/24 (!) 158/82   07/22/24 (!) 170/82     Pulse Readings from Last 3 Encounters:   09/16/24 80   08/26/24 89   07/22/24 89       BP (!) 148/64   Pulse 80   Ht 5' 6" (1.676 m)   SpO2 99%   BMI 24.20 kg/m²     Objective:   Physical Exam  Vitals and nursing note reviewed.   Constitutional:       General: She is not in acute distress.     Appearance: She is well-developed. She is not diaphoretic.   HENT:      Head: Normocephalic and atraumatic.      Nose: Nose normal.   Eyes:      General: No scleral icterus.     Conjunctiva/sclera: Conjunctivae normal.   Neck:      Thyroid: No thyromegaly.      Vascular: No JVD.   Cardiovascular:      Rate and Rhythm: Normal rate and regular rhythm.      Heart sounds: S1 normal and S2 normal. Murmur heard.      No friction rub. No gallop. No S3 or S4 sounds.   Pulmonary:      Effort: Pulmonary effort is normal. No respiratory distress.      Breath sounds: Normal breath sounds. No stridor. No wheezing or rales.   Chest:      Chest wall: No tenderness.   Abdominal:      General: Bowel sounds are normal. There is no distension.      Palpations: Abdomen is soft. There is " no mass.      Tenderness: There is no abdominal tenderness. There is no rebound.   Genitourinary:     Comments: Deferred  Musculoskeletal:         General: No tenderness or deformity. Normal range of motion.      Cervical back: Normal range of motion and neck supple.      Right lower leg: Edema present.      Left lower leg: Edema present.   Lymphadenopathy:      Cervical: No cervical adenopathy.   Skin:     General: Skin is warm and dry.      Coloration: Skin is not pale.      Findings: No erythema or rash.   Neurological:      Mental Status: She is alert and oriented to person, place, and time.      Motor: No abnormal muscle tone.      Coordination: Coordination normal.   Psychiatric:         Behavior: Behavior normal.         Thought Content: Thought content normal.         Judgment: Judgment normal.         Lab Results   Component Value Date     07/22/2024    K 4.0 07/22/2024     07/22/2024    CO2 24 07/22/2024    BUN 15 07/22/2024    CREATININE 1.2 07/22/2024     07/22/2024    MG 1.9 07/22/2024    AST 26 07/22/2024    ALT 15 07/22/2024    ALBUMIN 3.8 07/22/2024    PROT 8.9 (H) 07/22/2024    BILITOT 0.5 07/22/2024    WBC 7.57 07/11/2024    HGB 11.5 (L) 07/11/2024    HCT 36.5 (L) 07/11/2024    MCV 96 07/11/2024     07/11/2024    TSH 3.440 07/02/2024     (H) 07/22/2024         BNP (pg/mL)   Date Value   07/22/2024 104 (H)          Assessment:      1. Acute on chronic diastolic congestive heart failure    2. Localized edema    3. Chest pain, unspecified type    4. Elevated troponin    5. Diastolic dysfunction    6. Anemia, unspecified type    7. Primary hypertension    8. Chronic heart failure with preserved ejection fraction          Plan:     Chest pain, elevated trop  - ECHO 7/2023 with normal bi V function, grade 1 DD, mild MR, PASP 34 mmHg.   - likely sec to demand ischemia  - order pharm nuclear stress test, pt cannot walk on treadmill due to back pain - does not want it now      2. HTN with grade 1 DD with LE edema; last , had CHF exac with > 10 lbs weight gain, improving   - titrate meds - DC Norvasc   - cont diuretics   - cont low salt diet  - order LE u/s venous  - could not tolerate due to pain, refer to pain management   - repeat labs, added Lasix 20mg BID --> increased to lasix 40mg BID PRN - hold now    3. GERD sx   - cont PPI and f/u GI    4. CKD, anemia  - cont to monitor     Visit today included increased complexity associated with the care of the episodic problem edema addressed and managing the longitudinal care of the patient due to the serious and/or complex managed problem(s) .      Thank you for allowing me to participate in this patient's care. Please do not hesitate to contact me with any questions or concerns. Consult note has been forwarded to the referral physician.

## 2024-09-17 ENCOUNTER — LAB VISIT (OUTPATIENT)
Dept: LAB | Facility: HOSPITAL | Age: 79
End: 2024-09-17
Attending: INTERNAL MEDICINE
Payer: MEDICARE

## 2024-09-17 DIAGNOSIS — I50.33 ACUTE ON CHRONIC DIASTOLIC CONGESTIVE HEART FAILURE: ICD-10-CM

## 2024-09-17 LAB
ANION GAP SERPL CALC-SCNC: 6 MMOL/L (ref 8–16)
BNP SERPL-MCNC: 112 PG/ML (ref 0–99)
BUN SERPL-MCNC: 50 MG/DL (ref 8–23)
CALCIUM SERPL-MCNC: 9.7 MG/DL (ref 8.7–10.5)
CHLORIDE SERPL-SCNC: 105 MMOL/L (ref 95–110)
CO2 SERPL-SCNC: 20 MMOL/L (ref 23–29)
CREAT SERPL-MCNC: 2.2 MG/DL (ref 0.5–1.4)
EST. GFR  (NO RACE VARIABLE): 22.1 ML/MIN/1.73 M^2
GLUCOSE SERPL-MCNC: 104 MG/DL (ref 70–110)
MAGNESIUM SERPL-MCNC: 2.3 MG/DL (ref 1.6–2.6)
POTASSIUM SERPL-SCNC: 5.3 MMOL/L (ref 3.5–5.1)
SODIUM SERPL-SCNC: 131 MMOL/L (ref 136–145)

## 2024-09-17 PROCEDURE — 83880 ASSAY OF NATRIURETIC PEPTIDE: CPT | Performed by: INTERNAL MEDICINE

## 2024-09-17 PROCEDURE — 80048 BASIC METABOLIC PNL TOTAL CA: CPT | Performed by: INTERNAL MEDICINE

## 2024-09-17 PROCEDURE — 83735 ASSAY OF MAGNESIUM: CPT | Performed by: INTERNAL MEDICINE

## 2024-09-17 PROCEDURE — 36415 COLL VENOUS BLD VENIPUNCTURE: CPT | Mod: PO | Performed by: INTERNAL MEDICINE

## 2024-09-19 ENCOUNTER — TELEPHONE (OUTPATIENT)
Dept: GASTROENTEROLOGY | Facility: CLINIC | Age: 79
End: 2024-09-19
Payer: MEDICARE

## 2024-09-19 NOTE — TELEPHONE ENCOUNTER
Contacted pts claudia dolan and scheduled an appointment.Pt aunt needed an appt for colitis. Appt date time and location was given-AB pt verbalized understanding.

## 2024-09-19 NOTE — TELEPHONE ENCOUNTER
----- Message from Hawa Garcia sent at 9/19/2024  4:20 PM CDT -----  Contact: lenore Leyva  Type:  Sooner Apoointment Request    Caller is requesting a sooner appointment.  Caller declined first available appointment listed below.  Caller will not accept being placed on the waitlist and is requesting a message be sent to doctor.  Name of Caller: lenore Leyva   When is the first available appointment? phone  Symptoms: Hospital discharge follow-up [Z09], Colitis [K52.9]  Would the patient rather a call back or a response via [facility]? phone  Best Call Back Number:[phone]  Additional Information: referred by Moose Kruse

## 2024-10-07 ENCOUNTER — PATIENT MESSAGE (OUTPATIENT)
Dept: RESEARCH | Facility: HOSPITAL | Age: 79
End: 2024-10-07
Payer: MEDICARE

## 2024-10-07 ENCOUNTER — OFFICE VISIT (OUTPATIENT)
Dept: CARDIOLOGY | Facility: CLINIC | Age: 79
End: 2024-10-07
Payer: MEDICARE

## 2024-10-07 ENCOUNTER — LAB VISIT (OUTPATIENT)
Dept: LAB | Facility: HOSPITAL | Age: 79
End: 2024-10-07
Attending: INTERNAL MEDICINE
Payer: MEDICARE

## 2024-10-07 VITALS
HEART RATE: 75 BPM | BODY MASS INDEX: 23.66 KG/M2 | SYSTOLIC BLOOD PRESSURE: 205 MMHG | WEIGHT: 146.63 LBS | DIASTOLIC BLOOD PRESSURE: 80 MMHG | OXYGEN SATURATION: 98 %

## 2024-10-07 DIAGNOSIS — I51.89 DIASTOLIC DYSFUNCTION: ICD-10-CM

## 2024-10-07 DIAGNOSIS — I10 HYPERTENSION, UNSPECIFIED TYPE: ICD-10-CM

## 2024-10-07 DIAGNOSIS — R79.89 ELEVATED TROPONIN: ICD-10-CM

## 2024-10-07 DIAGNOSIS — I50.33 ACUTE ON CHRONIC DIASTOLIC CONGESTIVE HEART FAILURE: Primary | ICD-10-CM

## 2024-10-07 DIAGNOSIS — M54.9 BACK PAIN, UNSPECIFIED BACK LOCATION, UNSPECIFIED BACK PAIN LATERALITY, UNSPECIFIED CHRONICITY: ICD-10-CM

## 2024-10-07 DIAGNOSIS — I50.32 CHRONIC HEART FAILURE WITH PRESERVED EJECTION FRACTION: ICD-10-CM

## 2024-10-07 DIAGNOSIS — R07.9 CHEST PAIN, UNSPECIFIED TYPE: ICD-10-CM

## 2024-10-07 DIAGNOSIS — R60.0 LOCALIZED EDEMA: ICD-10-CM

## 2024-10-07 DIAGNOSIS — I50.33 ACUTE ON CHRONIC DIASTOLIC CONGESTIVE HEART FAILURE: ICD-10-CM

## 2024-10-07 LAB
ANION GAP SERPL CALC-SCNC: 9 MMOL/L (ref 8–16)
BNP SERPL-MCNC: 249 PG/ML (ref 0–99)
BUN SERPL-MCNC: 20 MG/DL (ref 8–23)
CALCIUM SERPL-MCNC: 9.5 MG/DL (ref 8.7–10.5)
CHLORIDE SERPL-SCNC: 106 MMOL/L (ref 95–110)
CO2 SERPL-SCNC: 23 MMOL/L (ref 23–29)
CREAT SERPL-MCNC: 1.5 MG/DL (ref 0.5–1.4)
EST. GFR  (NO RACE VARIABLE): 35 ML/MIN/1.73 M^2
GLUCOSE SERPL-MCNC: 102 MG/DL (ref 70–110)
MAGNESIUM SERPL-MCNC: 1.9 MG/DL (ref 1.6–2.6)
POTASSIUM SERPL-SCNC: 3.9 MMOL/L (ref 3.5–5.1)
SODIUM SERPL-SCNC: 138 MMOL/L (ref 136–145)

## 2024-10-07 PROCEDURE — 99999 PR PBB SHADOW E&M-EST. PATIENT-LVL III: CPT | Mod: PBBFAC,,, | Performed by: INTERNAL MEDICINE

## 2024-10-07 PROCEDURE — G2211 COMPLEX E/M VISIT ADD ON: HCPCS | Mod: S$PBB,,, | Performed by: INTERNAL MEDICINE

## 2024-10-07 PROCEDURE — 83880 ASSAY OF NATRIURETIC PEPTIDE: CPT | Performed by: INTERNAL MEDICINE

## 2024-10-07 PROCEDURE — 99215 OFFICE O/P EST HI 40 MIN: CPT | Mod: S$PBB,,, | Performed by: INTERNAL MEDICINE

## 2024-10-07 PROCEDURE — 80048 BASIC METABOLIC PNL TOTAL CA: CPT | Performed by: INTERNAL MEDICINE

## 2024-10-07 PROCEDURE — 36415 COLL VENOUS BLD VENIPUNCTURE: CPT | Mod: PO | Performed by: INTERNAL MEDICINE

## 2024-10-07 PROCEDURE — 83735 ASSAY OF MAGNESIUM: CPT | Performed by: INTERNAL MEDICINE

## 2024-10-07 PROCEDURE — 99213 OFFICE O/P EST LOW 20 MIN: CPT | Mod: PBBFAC,PO | Performed by: INTERNAL MEDICINE

## 2024-10-07 RX ORDER — OLMESARTAN MEDOXOMIL 40 MG/1
40 TABLET ORAL NIGHTLY
Qty: 30 TABLET | Refills: 3 | Status: SHIPPED | OUTPATIENT
Start: 2024-10-07 | End: 2025-10-07

## 2024-10-07 RX ORDER — CLONIDINE HYDROCHLORIDE 0.2 MG/1
0.2 TABLET ORAL
Status: COMPLETED | OUTPATIENT
Start: 2024-10-07 | End: 2024-10-07

## 2024-10-07 RX ADMIN — CLONIDINE HYDROCHLORIDE 0.2 MG: 0.2 TABLET ORAL at 04:10

## 2024-10-07 NOTE — PROGRESS NOTES
Subjective:   Patient ID:  Isabel Miles is a 78 y.o. female who presents for cardiac consult of Follow-up and Leg Swelling      Referral by: No referring provider defined for this encounter.     Reason for consult: HTN, elevated Trop      HPI  The patient came in today for cardiac consult of Follow-up and Leg Swelling      Isabel Miles is a 78 y.o. female pt with HTN, HFPEF, GERD, anemia, CKD presents for follow up CV Eval.        7/22/24 - hosp follow up - initial CV visit    Troponin I 0.000 - 0.026 ng/mL 0.044 High  0.047 High  CM 0.046 High  CM     ECHO 7/2023 with normal bi V function, grade 1 DD, mild MR, PASP 34 mmHg.   She has more edema now since being on Norvasc.   Pt moved from [place] recently has not been to PCP.     8/26/24    BNP (pg/mL)   Date Value   09/17/2024 112 (H)   09/16/2024 104 (H)   07/22/2024 104 (H)      Started on HCTZ at last visit.   BP elevated 158/82. HR 89. BMI 24 - 149 lbs  (was 133 lbs 1 months ago)  Has worsening edema in feet.     9/16/24  Repeat labs and LE u/s not completed after last visit.   From pt- Isabel is still retaining a lot of fluid in the upper right leg, and she is now having a right below her breast. Is this anything to be concerned with?   Told pt - double the lasix to 40mg twice a day, need to double up potassium also; if the swelling continues to get worse over next 1-2 days I suggest ER eval and admission for IV lasix and also to rule out a blood clot if swelling is getting worse. Follow up with me next week early or week after need to repeat labs also.     BP mildly elevated 148/60. HR 80. BMI 24 - 149 lbs   She has more pain, back pain and leg pain could not tolerate the u/s  She her swelling has improved, now off lasix.     10/7/24     BNP (pg/mL)   Date Value   09/17/2024 112 (H)   09/16/2024 104 (H)   07/22/2024 104 (H)      Since last visit -  the labs - she is significantly dehydrated - recommend increase water intake all day- avoid HCTZ,  Lasix and Benicar next few days; likely doesn't need any further lasix or HCTZ in future, needs to stop taking potassium, I have repeat labs ordered for tomorrow. If doesn't get labs tomorrow or misses it then she needs to go to the ER. If the labs still look bad tomorrow or worsens then I recommend ER eval.     BP elevated 221/90. HR 75. BMI 23 146 lbs   She has back pain - stable           Results for orders placed during the hospital encounter of 07/05/24    Echo    Interpretation Summary    Left Ventricle: The left ventricle is normal in size. Normal wall thickness. There is normal systolic function with a visually estimated ejection fraction of 60 - 65%. Grade I diastolic dysfunction.    Right Ventricle: Normal right ventricular cavity size. Wall thickness is normal. Systolic function is normal.    Tricuspid Valve: There is mild regurgitation.    Pulmonary Artery: The estimated pulmonary artery systolic pressure is 34 mmHg.    IVC/SVC: Normal venous pressure at 3 mmHg.      No results found for this or any previous visit.      No results found for this or any previous visit.      No cardiac monitor results found for the past 12 months         Past Medical History:   Diagnosis Date    Hypertension        Past Surgical History:   Procedure Laterality Date    CHOLECYSTECTOMY         Social History     Tobacco Use    Smoking status: Never     Passive exposure: Current (niece smokes outside)    Smokeless tobacco: Never   Substance Use Topics    Alcohol use: Never    Drug use: Never       No family history on file.    Patient's Medications   New Prescriptions    No medications on file   Previous Medications    HYDROCHLOROTHIAZIDE (HYDRODIURIL) 25 MG TABLET    Take 1 tablet (25 mg total) by mouth once daily. Do not take if BP is below 110/70    LIDOCAINE (LIDODERM) 5 %    Place 1 patch onto the skin daily as needed. Do not leave patch on for > 12 hrs.    MAGNESIUM OXIDE (MAG-OX) 400 MG (241.3 MG MAGNESIUM) TABLET     Take 1 tablet (400 mg total) by mouth once daily.    OLMESARTAN (BENICAR) 40 MG TABLET    Take 1 tablet (40 mg total) by mouth every evening.    PANTOPRAZOLE (PROTONIX) 40 MG TABLET    Take 1 tablet (40 mg total) by mouth once daily.    POTASSIUM CHLORIDE (KLOR-CON) 8 MEQ TBSR    Take 1 tablet (8 mEq total) by mouth 2 (two) times daily. Take with fluid pills   Modified Medications    No medications on file   Discontinued Medications    No medications on file       Review of Systems   Constitutional: Negative.    HENT: Negative.     Eyes: Negative.    Respiratory: Negative.     Cardiovascular:  Positive for chest pain and leg swelling.   Gastrointestinal:  Positive for heartburn.   Genitourinary: Negative.    Musculoskeletal:  Positive for back pain and joint pain.   Skin: Negative.    Neurological: Negative.    Endo/Heme/Allergies: Negative.    Psychiatric/Behavioral: Negative.     All 12 systems otherwise negative.      Wt Readings from Last 3 Encounters:   10/07/24 66.5 kg (146 lb 9.7 oz)   08/26/24 68 kg (149 lb 14.6 oz)   07/22/24 63.6 kg (140 lb 3.4 oz)     Temp Readings from Last 3 Encounters:   07/11/24 97 °F (36.1 °C) (Tympanic)   07/06/24 98.1 °F (36.7 °C) (Oral)   07/05/24 98 °F (36.7 °C) (Oral)     BP Readings from Last 3 Encounters:   10/07/24 (!) 221/90   09/16/24 (!) 148/64   08/26/24 (!) 158/82     Pulse Readings from Last 3 Encounters:   10/07/24 75   09/16/24 80   08/26/24 89       BP (!) 221/90 (BP Location: Left arm, Patient Position: Sitting)   Pulse 75   Wt 66.5 kg (146 lb 9.7 oz)   SpO2 98%   BMI 23.66 kg/m²     Objective:   Physical Exam  Vitals and nursing note reviewed.   Constitutional:       General: She is not in acute distress.     Appearance: She is well-developed. She is not diaphoretic.   HENT:      Head: Normocephalic and atraumatic.      Nose: Nose normal.   Eyes:      General: No scleral icterus.     Conjunctiva/sclera: Conjunctivae normal.   Neck:      Thyroid: No thyromegaly.       Vascular: No JVD.   Cardiovascular:      Rate and Rhythm: Normal rate and regular rhythm.      Heart sounds: S1 normal and S2 normal. Murmur heard.      No friction rub. No gallop. No S3 or S4 sounds.   Pulmonary:      Effort: Pulmonary effort is normal. No respiratory distress.      Breath sounds: Normal breath sounds. No stridor. No wheezing or rales.   Chest:      Chest wall: No tenderness.   Abdominal:      General: Bowel sounds are normal. There is no distension.      Palpations: Abdomen is soft. There is no mass.      Tenderness: There is no abdominal tenderness. There is no rebound.   Genitourinary:     Comments: Deferred  Musculoskeletal:         General: No tenderness or deformity. Normal range of motion.      Cervical back: Normal range of motion and neck supple.      Right lower leg: Edema present.      Left lower leg: Edema present.   Lymphadenopathy:      Cervical: No cervical adenopathy.   Skin:     General: Skin is warm and dry.      Coloration: Skin is not pale.      Findings: No erythema or rash.   Neurological:      Mental Status: She is alert and oriented to person, place, and time.      Motor: No abnormal muscle tone.      Coordination: Coordination normal.   Psychiatric:         Behavior: Behavior normal.         Thought Content: Thought content normal.         Judgment: Judgment normal.         Lab Results   Component Value Date     (L) 09/17/2024    K 5.3 (H) 09/17/2024     09/17/2024    CO2 20 (L) 09/17/2024    BUN 50 (H) 09/17/2024    CREATININE 2.2 (H) 09/17/2024     09/17/2024    MG 2.3 09/17/2024    AST 26 07/22/2024    ALT 15 07/22/2024    ALBUMIN 3.8 07/22/2024    PROT 8.9 (H) 07/22/2024    BILITOT 0.5 07/22/2024    WBC 7.57 07/11/2024    HGB 11.5 (L) 07/11/2024    HCT 36.5 (L) 07/11/2024    MCV 96 07/11/2024     07/11/2024    TSH 3.440 07/02/2024     (H) 09/17/2024         BNP (pg/mL)   Date Value   09/17/2024 112 (H)   09/16/2024 104 (H)    07/22/2024 104 (H)          Assessment:      1. Acute on chronic diastolic congestive heart failure    2. Localized edema    3. Diastolic dysfunction    4. Elevated troponin    5. Chronic heart failure with preserved ejection fraction    6. Back pain, unspecified back location, unspecified back pain laterality, unspecified chronicity    7. Hypertension, unspecified type          Plan:     Chest pain, elevated trop  - ECHO 7/2023 with normal bi V function, grade 1 DD, mild MR, PASP 34 mmHg.   - likely sec to demand ischemia  - order pharm nuclear stress test, pt cannot walk on treadmill due to back pain - does not want it now     2. HTN with grade 1 DD with LE edema; last , had CHF exac with > 10 lbs weight gain, - losing weight -  BMI 23 146 lbs   - titrate meds - DC Norvasc   - cont diuretics   - cont low salt diet  - order LE u/s venous  - could not tolerate due to pain, refer to pain management   - repeat labs, added Lasix 20mg BID --> increased to lasix 40mg BID PRN -   - off meds  now - will restart Benicar  - CLonidine 0.2 x 1 now     3. GERD sx   - cont PPI and f/u GI    4. CKD, anemia, with recent Hyperkalemia - improving K  - cont to monitor   - stopped K    Visit today included increased complexity associated with the care of the episodic problem edema addressed and managing the longitudinal care of the patient due to the serious and/or complex managed problem(s) .      Thank you for allowing me to participate in this patient's care. Please do not hesitate to contact me with any questions or concerns. Consult note has been forwarded to the referral physician.

## 2024-10-08 ENCOUNTER — TELEPHONE (OUTPATIENT)
Dept: CARDIOLOGY | Facility: CLINIC | Age: 79
End: 2024-10-08
Payer: MEDICARE

## 2024-10-08 RX ORDER — FUROSEMIDE 40 MG/1
40 TABLET ORAL DAILY
Qty: 30 TABLET | Refills: 3 | Status: SHIPPED | OUTPATIENT
Start: 2024-10-08 | End: 2025-10-08

## 2024-10-08 RX ORDER — LANOLIN ALCOHOL/MO/W.PET/CERES
400 CREAM (GRAM) TOPICAL DAILY
Qty: 90 TABLET | Refills: 1 | Status: SHIPPED | OUTPATIENT
Start: 2024-10-08

## 2024-10-08 RX ORDER — POTASSIUM CHLORIDE 600 MG/1
8 TABLET, FILM COATED, EXTENDED RELEASE ORAL DAILY
Qty: 90 TABLET | Refills: 1 | Status: SHIPPED | OUTPATIENT
Start: 2024-10-08

## 2024-10-08 NOTE — TELEPHONE ENCOUNTER
Bala Fried MD [facility] Staff  Please contact the patient and let them know that their results reveal improved kidney function and potassium, BNP is elevated due to extra fluid, will restart Lasix 40mg daily along with potassium and magnesium, will stop HCTZ. If not having much improvement in urine output or BP - recommend doubling up lasix to twice a day for 3 days along with extra potassium. Will repeat labs at follow up, or can see me sooner in next 1-2 weeks. Thanks   [facility] Results      No answer lvm to return call

## 2024-10-14 ENCOUNTER — TELEPHONE (OUTPATIENT)
Dept: CARDIOLOGY | Facility: CLINIC | Age: 79
End: 2024-10-14

## 2024-10-14 ENCOUNTER — LAB VISIT (OUTPATIENT)
Dept: LAB | Facility: HOSPITAL | Age: 79
End: 2024-10-14
Attending: INTERNAL MEDICINE
Payer: MEDICARE

## 2024-10-14 ENCOUNTER — OFFICE VISIT (OUTPATIENT)
Dept: CARDIOLOGY | Facility: CLINIC | Age: 79
End: 2024-10-14
Payer: MEDICARE

## 2024-10-14 VITALS
WEIGHT: 142 LBS | HEART RATE: 84 BPM | BODY MASS INDEX: 22.92 KG/M2 | SYSTOLIC BLOOD PRESSURE: 160 MMHG | OXYGEN SATURATION: 99 % | DIASTOLIC BLOOD PRESSURE: 62 MMHG

## 2024-10-14 DIAGNOSIS — I50.32 CHRONIC HEART FAILURE WITH PRESERVED EJECTION FRACTION: ICD-10-CM

## 2024-10-14 DIAGNOSIS — R79.89 ELEVATED TROPONIN: ICD-10-CM

## 2024-10-14 DIAGNOSIS — I50.33 ACUTE ON CHRONIC DIASTOLIC CONGESTIVE HEART FAILURE: ICD-10-CM

## 2024-10-14 DIAGNOSIS — I10 HYPERTENSION, UNSPECIFIED TYPE: ICD-10-CM

## 2024-10-14 DIAGNOSIS — I50.33 ACUTE ON CHRONIC DIASTOLIC CONGESTIVE HEART FAILURE: Primary | ICD-10-CM

## 2024-10-14 DIAGNOSIS — M54.9 BACK PAIN, UNSPECIFIED BACK LOCATION, UNSPECIFIED BACK PAIN LATERALITY, UNSPECIFIED CHRONICITY: ICD-10-CM

## 2024-10-14 DIAGNOSIS — R07.9 CHEST PAIN, UNSPECIFIED TYPE: ICD-10-CM

## 2024-10-14 DIAGNOSIS — I51.89 DIASTOLIC DYSFUNCTION: ICD-10-CM

## 2024-10-14 DIAGNOSIS — R60.0 LOCALIZED EDEMA: ICD-10-CM

## 2024-10-14 LAB
ALBUMIN SERPL BCP-MCNC: 3.4 G/DL (ref 3.5–5.2)
ALP SERPL-CCNC: 120 U/L (ref 55–135)
ALT SERPL W/O P-5'-P-CCNC: 18 U/L (ref 10–44)
ANION GAP SERPL CALC-SCNC: 13 MMOL/L (ref 8–16)
ANION GAP SERPL CALC-SCNC: 13 MMOL/L (ref 8–16)
AST SERPL-CCNC: 27 U/L (ref 10–40)
BILIRUB SERPL-MCNC: 0.3 MG/DL (ref 0.1–1)
BNP SERPL-MCNC: 137 PG/ML (ref 0–99)
BNP SERPL-MCNC: 137 PG/ML (ref 0–99)
BUN SERPL-MCNC: 36 MG/DL (ref 8–23)
BUN SERPL-MCNC: 36 MG/DL (ref 8–23)
CALCIUM SERPL-MCNC: 8.8 MG/DL (ref 8.7–10.5)
CALCIUM SERPL-MCNC: 8.8 MG/DL (ref 8.7–10.5)
CHLORIDE SERPL-SCNC: 98 MMOL/L (ref 95–110)
CHLORIDE SERPL-SCNC: 98 MMOL/L (ref 95–110)
CO2 SERPL-SCNC: 23 MMOL/L (ref 23–29)
CO2 SERPL-SCNC: 23 MMOL/L (ref 23–29)
CREAT SERPL-MCNC: 2.4 MG/DL (ref 0.5–1.4)
CREAT SERPL-MCNC: 2.4 MG/DL (ref 0.5–1.4)
EST. GFR  (NO RACE VARIABLE): 19.8 ML/MIN/1.73 M^2
EST. GFR  (NO RACE VARIABLE): 19.8 ML/MIN/1.73 M^2
GLUCOSE SERPL-MCNC: 105 MG/DL (ref 70–110)
GLUCOSE SERPL-MCNC: 105 MG/DL (ref 70–110)
MAGNESIUM SERPL-MCNC: 2.1 MG/DL (ref 1.6–2.6)
MAGNESIUM SERPL-MCNC: 2.1 MG/DL (ref 1.6–2.6)
POTASSIUM SERPL-SCNC: 3.2 MMOL/L (ref 3.5–5.1)
POTASSIUM SERPL-SCNC: 3.2 MMOL/L (ref 3.5–5.1)
PROT SERPL-MCNC: 7.6 G/DL (ref 6–8.4)
SODIUM SERPL-SCNC: 134 MMOL/L (ref 136–145)
SODIUM SERPL-SCNC: 134 MMOL/L (ref 136–145)

## 2024-10-14 PROCEDURE — 99214 OFFICE O/P EST MOD 30 MIN: CPT | Mod: S$PBB,,, | Performed by: INTERNAL MEDICINE

## 2024-10-14 PROCEDURE — 83735 ASSAY OF MAGNESIUM: CPT | Performed by: INTERNAL MEDICINE

## 2024-10-14 PROCEDURE — 99999 PR PBB SHADOW E&M-EST. PATIENT-LVL III: CPT | Mod: PBBFAC,,, | Performed by: INTERNAL MEDICINE

## 2024-10-14 PROCEDURE — 36415 COLL VENOUS BLD VENIPUNCTURE: CPT | Mod: PO | Performed by: INTERNAL MEDICINE

## 2024-10-14 PROCEDURE — 99213 OFFICE O/P EST LOW 20 MIN: CPT | Mod: PBBFAC,PO | Performed by: INTERNAL MEDICINE

## 2024-10-14 PROCEDURE — G2211 COMPLEX E/M VISIT ADD ON: HCPCS | Mod: S$PBB,,, | Performed by: INTERNAL MEDICINE

## 2024-10-14 PROCEDURE — 83880 ASSAY OF NATRIURETIC PEPTIDE: CPT | Performed by: INTERNAL MEDICINE

## 2024-10-14 PROCEDURE — 80053 COMPREHEN METABOLIC PANEL: CPT | Performed by: INTERNAL MEDICINE

## 2024-10-14 RX ORDER — FUROSEMIDE 40 MG/1
40 TABLET ORAL DAILY PRN
Qty: 30 TABLET | Refills: 3 | Status: SHIPPED | OUTPATIENT
Start: 2024-10-14 | End: 2025-10-14

## 2024-10-14 RX ORDER — POTASSIUM CHLORIDE 600 MG/1
8 TABLET, FILM COATED, EXTENDED RELEASE ORAL DAILY PRN
Qty: 90 TABLET | Refills: 1 | Status: SHIPPED | OUTPATIENT
Start: 2024-10-14

## 2024-10-14 NOTE — PROGRESS NOTES
Subjective:   Patient ID:  Isabel Miles is a 79 y.o. female who presents for cardiac consult of Fatigue      Referral by: No referring provider defined for this encounter.     Reason for consult: HTN, elevated Trop      HPI  The patient came in today for cardiac consult of Fatigue      Isabel Miles is a 79 y.o. female pt with HTN, HFPEF, GERD, anemia, CKD presents for follow up CV Eval.          10/7/24     BNP (pg/mL)   Date Value   10/07/2024 249 (H)   09/17/2024 112 (H)   09/16/2024 104 (H)   07/22/2024 104 (H)      Since last visit -  the labs - she is significantly dehydrated - recommend increase water intake all day- avoid HCTZ, Lasix and Benicar next few days; likely doesn't need any further lasix or HCTZ in future, needs to stop taking potassium, I have repeat labs ordered for tomorrow. If doesn't get labs tomorrow or misses it then she needs to go to the ER. If the labs still look bad tomorrow or worsens then I recommend ER eval.     BP elevated 221/90. HR 75. BMI 23 146 lbs   She has back pain - stable     10/14/24  BNP last week elevated at 249, restarted diuretics and BP meds.   Creatinine 0.5 - 1.4 mg/dL 1.5 High  2.2 High  2.6 High  1.2   Renal function improving.   /62. HR 94  Told pt - results reveal improved kidney function and potassium, BNP is elevated due to extra fluid, will restart Lasix 40mg daily along with potassium and magnesium, will stop HCTZ. If not having much improvement in urine output or BP - recommend doubling up lasix to twice a day for 3 days along with extra potassium. Will repeat labs at follow up, or can see me sooner in next 1-2 weeks.     She has been on back on lasix - on 20mg in AM. Did not take the 40mg. BP was low yest - did not eat much.   She had stomach cramps.     Results for orders placed during the hospital encounter of 07/05/24    Echo    Interpretation Summary    Left Ventricle: The left ventricle is normal in size. Normal wall thickness. There is  normal systolic function with a visually estimated ejection fraction of 60 - 65%. Grade I diastolic dysfunction.    Right Ventricle: Normal right ventricular cavity size. Wall thickness is normal. Systolic function is normal.    Tricuspid Valve: There is mild regurgitation.    Pulmonary Artery: The estimated pulmonary artery systolic pressure is 34 mmHg.    IVC/SVC: Normal venous pressure at 3 mmHg.      No results found for this or any previous visit.      No results found for this or any previous visit.      No cardiac monitor results found for the past 12 months         Past Medical History:   Diagnosis Date    Hypertension        Past Surgical History:   Procedure Laterality Date    CHOLECYSTECTOMY         Social History     Tobacco Use    Smoking status: Never     Passive exposure: Current (niece smokes outside)    Smokeless tobacco: Never   Substance Use Topics    Alcohol use: Never    Drug use: Never       No family history on file.    Patient's Medications   New Prescriptions    No medications on file   Previous Medications    FUROSEMIDE (LASIX) 40 MG TABLET    Take 1 tablet (40 mg total) by mouth once daily.    LIDOCAINE (LIDODERM) 5 %    Place 1 patch onto the skin daily as needed. Do not leave patch on for > 12 hrs.    MAGNESIUM OXIDE (MAG-OX) 400 MG (241.3 MG MAGNESIUM) TABLET    Take 1 tablet (400 mg total) by mouth once daily.    OLMESARTAN (BENICAR) 40 MG TABLET    Take 1 tablet (40 mg total) by mouth every evening.    PANTOPRAZOLE (PROTONIX) 40 MG TABLET    Take 1 tablet (40 mg total) by mouth once daily.    POTASSIUM CHLORIDE (KLOR-CON) 8 MEQ TBSR    Take 1 tablet (8 mEq total) by mouth once daily. Take with fluid pills   Modified Medications    No medications on file   Discontinued Medications    No medications on file       Review of Systems   Constitutional: Negative.    HENT: Negative.     Eyes: Negative.    Respiratory: Negative.     Cardiovascular:  Positive for chest pain and leg swelling.    Gastrointestinal:  Positive for heartburn.   Genitourinary: Negative.    Musculoskeletal:  Positive for back pain and joint pain.   Skin: Negative.    Neurological: Negative.    Endo/Heme/Allergies: Negative.    Psychiatric/Behavioral: Negative.     All 12 systems otherwise negative.      Wt Readings from Last 3 Encounters:   10/14/24 64.4 kg (141 lb 15.6 oz)   10/07/24 66.5 kg (146 lb 9.7 oz)   08/26/24 68 kg (149 lb 14.6 oz)     Temp Readings from Last 3 Encounters:   07/11/24 97 °F (36.1 °C) (Tympanic)   07/06/24 98.1 °F (36.7 °C) (Oral)   07/05/24 98 °F (36.7 °C) (Oral)     BP Readings from Last 3 Encounters:   10/14/24 (!) 160/62   10/07/24 (!) 205/80   09/16/24 (!) 148/64     Pulse Readings from Last 3 Encounters:   10/14/24 84   10/07/24 75   09/16/24 80       BP (!) 160/62   Pulse 84   Wt 64.4 kg (141 lb 15.6 oz)   SpO2 99%   BMI 22.92 kg/m²     Objective:   Physical Exam  Vitals and nursing note reviewed.   Constitutional:       General: She is not in acute distress.     Appearance: She is well-developed. She is not diaphoretic.   HENT:      Head: Normocephalic and atraumatic.      Nose: Nose normal.   Eyes:      General: No scleral icterus.     Conjunctiva/sclera: Conjunctivae normal.   Neck:      Thyroid: No thyromegaly.      Vascular: No JVD.   Cardiovascular:      Rate and Rhythm: Normal rate and regular rhythm.      Heart sounds: S1 normal and S2 normal. Murmur heard.      No friction rub. No gallop. No S3 or S4 sounds.   Pulmonary:      Effort: Pulmonary effort is normal. No respiratory distress.      Breath sounds: Normal breath sounds. No stridor. No wheezing or rales.   Chest:      Chest wall: No tenderness.   Abdominal:      General: Bowel sounds are normal. There is no distension.      Palpations: Abdomen is soft. There is no mass.      Tenderness: There is no abdominal tenderness. There is no rebound.   Genitourinary:     Comments: Deferred  Musculoskeletal:         General: No tenderness  or deformity. Normal range of motion.      Cervical back: Normal range of motion and neck supple.      Right lower leg: Edema present.      Left lower leg: Edema present.   Lymphadenopathy:      Cervical: No cervical adenopathy.   Skin:     General: Skin is warm and dry.      Coloration: Skin is not pale.      Findings: No erythema or rash.   Neurological:      Mental Status: She is alert and oriented to person, place, and time.      Motor: No abnormal muscle tone.      Coordination: Coordination normal.   Psychiatric:         Behavior: Behavior normal.         Thought Content: Thought content normal.         Judgment: Judgment normal.         Lab Results   Component Value Date     10/07/2024    K 3.9 10/07/2024     10/07/2024    CO2 23 10/07/2024    BUN 20 10/07/2024    CREATININE 1.5 (H) 10/07/2024     10/07/2024    MG 1.9 10/07/2024    AST 26 07/22/2024    ALT 15 07/22/2024    ALBUMIN 3.8 07/22/2024    PROT 8.9 (H) 07/22/2024    BILITOT 0.5 07/22/2024    WBC 7.57 07/11/2024    HGB 11.5 (L) 07/11/2024    HCT 36.5 (L) 07/11/2024    MCV 96 07/11/2024     07/11/2024    TSH 3.440 07/02/2024     (H) 10/07/2024         BNP (pg/mL)   Date Value   10/07/2024 249 (H)   09/17/2024 112 (H)   09/16/2024 104 (H)   07/22/2024 104 (H)          Assessment:      1. Acute on chronic diastolic congestive heart failure    2. Localized edema    3. Diastolic dysfunction    4. Chronic heart failure with preserved ejection fraction    5. Elevated troponin    6. Back pain, unspecified back location, unspecified back pain laterality, unspecified chronicity    7. Chest pain, unspecified type    8. Hypertension, unspecified type        Plan:     Chest pain, elevated trop  - ECHO 7/2023 with normal bi V function, grade 1 DD, mild MR, PASP 34 mmHg.   - likely sec to demand ischemia  - order pharm nuclear stress test, pt cannot walk on treadmill due to back pain - does not want it now     2. HTN with grade 1 DD  with LE edema; last  --> 249, had CHF exac with > 10 lbs weight gain, - losing weight -  BMI 23 - 146 lbs  --> 141 lbs   - titrate meds - DC Norvasc   - cont diuretics   - cont low salt diet  - order LE u/s venous  - could not tolerate due to pain, refer to pain management   - repeat labs, added Lasix 20mg BID --> increased to lasix 40mg BID PRN -   - off meds  now - will restart Benicar  - CLonidine 0.2 x 1 now     3. GERD sx   - cont PPI and f/u GI    4. CKD, anemia, with recent Hyperkalemia - improving K  - cont to monitor   - stopped K    5. Back pain, leg pain  - referred to pain management       Visit today included increased complexity associated with the care of the episodic problem edema addressed and managing the longitudinal care of the patient due to the serious and/or complex managed problem(s) .      Thank you for allowing me to participate in this patient's care. Please do not hesitate to contact me with any questions or concerns. Consult note has been forwarded to the referral physician.

## 2024-10-14 NOTE — TELEPHONE ENCOUNTER
----- Message from Bala Fried MD sent at 10/14/2024  3:37 PM CDT -----  Please contact the patient and let them know that their results of labs reveal improved fluid level - BNP - due to less fluid, kidneys appear dry now - do not take any more lasix unless having extra swelling/weight gain of 2-3 lbs in 24 hrs; potassium is low - recommend taking potassium tablets next 3 days daily and then only with lasix. Monitor BP and weights daily and take lasix only if having more fluid/CHF symptoms. Follow up in 2-3 weeks as scheduled. Thanks

## 2024-10-24 ENCOUNTER — TELEPHONE (OUTPATIENT)
Dept: PAIN MEDICINE | Facility: CLINIC | Age: 79
End: 2024-10-24
Payer: MEDICARE

## 2024-10-24 NOTE — TELEPHONE ENCOUNTER
Reached out to patient to reschedule appointment per the pt.  Apt has been made . All questions answered.     Martin Ramírez  Medical Assistant

## 2024-10-28 ENCOUNTER — LAB VISIT (OUTPATIENT)
Dept: LAB | Facility: HOSPITAL | Age: 79
End: 2024-10-28
Attending: INTERNAL MEDICINE
Payer: MEDICARE

## 2024-10-28 ENCOUNTER — OFFICE VISIT (OUTPATIENT)
Dept: CARDIOLOGY | Facility: CLINIC | Age: 79
End: 2024-10-28
Payer: MEDICARE

## 2024-10-28 VITALS
DIASTOLIC BLOOD PRESSURE: 60 MMHG | WEIGHT: 142.44 LBS | BODY MASS INDEX: 22.99 KG/M2 | SYSTOLIC BLOOD PRESSURE: 140 MMHG | HEART RATE: 79 BPM | OXYGEN SATURATION: 98 %

## 2024-10-28 DIAGNOSIS — M54.9 BACK PAIN, UNSPECIFIED BACK LOCATION, UNSPECIFIED BACK PAIN LATERALITY, UNSPECIFIED CHRONICITY: ICD-10-CM

## 2024-10-28 DIAGNOSIS — I50.32 CHRONIC HEART FAILURE WITH PRESERVED EJECTION FRACTION: ICD-10-CM

## 2024-10-28 DIAGNOSIS — R07.9 CHEST PAIN, UNSPECIFIED TYPE: ICD-10-CM

## 2024-10-28 DIAGNOSIS — I10 HYPERTENSION, UNSPECIFIED TYPE: ICD-10-CM

## 2024-10-28 DIAGNOSIS — I50.33 ACUTE ON CHRONIC DIASTOLIC CONGESTIVE HEART FAILURE: ICD-10-CM

## 2024-10-28 DIAGNOSIS — R79.89 ELEVATED TROPONIN: ICD-10-CM

## 2024-10-28 DIAGNOSIS — I50.33 ACUTE ON CHRONIC DIASTOLIC CONGESTIVE HEART FAILURE: Primary | ICD-10-CM

## 2024-10-28 DIAGNOSIS — R60.0 LOCALIZED EDEMA: ICD-10-CM

## 2024-10-28 DIAGNOSIS — I51.89 DIASTOLIC DYSFUNCTION: ICD-10-CM

## 2024-10-28 LAB
ANION GAP SERPL CALC-SCNC: 15 MMOL/L (ref 8–16)
BNP SERPL-MCNC: 94 PG/ML (ref 0–99)
BUN SERPL-MCNC: 32 MG/DL (ref 8–23)
CALCIUM SERPL-MCNC: 9.7 MG/DL (ref 8.7–10.5)
CHLORIDE SERPL-SCNC: 96 MMOL/L (ref 95–110)
CO2 SERPL-SCNC: 27 MMOL/L (ref 23–29)
CREAT SERPL-MCNC: 2.6 MG/DL (ref 0.5–1.4)
EST. GFR  (NO RACE VARIABLE): 18 ML/MIN/1.73 M^2
GLUCOSE SERPL-MCNC: 98 MG/DL (ref 70–110)
MAGNESIUM SERPL-MCNC: 2.4 MG/DL (ref 1.6–2.6)
POTASSIUM SERPL-SCNC: 4.5 MMOL/L (ref 3.5–5.1)
SODIUM SERPL-SCNC: 138 MMOL/L (ref 136–145)

## 2024-10-28 PROCEDURE — 83735 ASSAY OF MAGNESIUM: CPT | Performed by: INTERNAL MEDICINE

## 2024-10-28 PROCEDURE — G2211 COMPLEX E/M VISIT ADD ON: HCPCS | Mod: S$PBB,,, | Performed by: INTERNAL MEDICINE

## 2024-10-28 PROCEDURE — 99213 OFFICE O/P EST LOW 20 MIN: CPT | Mod: PBBFAC,PO | Performed by: INTERNAL MEDICINE

## 2024-10-28 PROCEDURE — 99214 OFFICE O/P EST MOD 30 MIN: CPT | Mod: S$PBB,,, | Performed by: INTERNAL MEDICINE

## 2024-10-28 PROCEDURE — 80048 BASIC METABOLIC PNL TOTAL CA: CPT | Performed by: INTERNAL MEDICINE

## 2024-10-28 PROCEDURE — 36415 COLL VENOUS BLD VENIPUNCTURE: CPT | Mod: PO | Performed by: INTERNAL MEDICINE

## 2024-10-28 PROCEDURE — 99999 PR PBB SHADOW E&M-EST. PATIENT-LVL III: CPT | Mod: PBBFAC,,, | Performed by: INTERNAL MEDICINE

## 2024-10-28 PROCEDURE — 83880 ASSAY OF NATRIURETIC PEPTIDE: CPT | Performed by: INTERNAL MEDICINE

## 2024-10-29 ENCOUNTER — TELEPHONE (OUTPATIENT)
Dept: CARDIOLOGY | Facility: CLINIC | Age: 79
End: 2024-10-29
Payer: MEDICARE

## 2024-11-11 ENCOUNTER — TELEPHONE (OUTPATIENT)
Dept: PAIN MEDICINE | Facility: CLINIC | Age: 79
End: 2024-11-11
Payer: MEDICARE

## 2024-12-16 ENCOUNTER — OFFICE VISIT (OUTPATIENT)
Dept: CARDIOLOGY | Facility: CLINIC | Age: 79
End: 2024-12-16
Payer: MEDICARE

## 2024-12-16 VITALS
DIASTOLIC BLOOD PRESSURE: 74 MMHG | SYSTOLIC BLOOD PRESSURE: 168 MMHG | HEART RATE: 93 BPM | WEIGHT: 153.88 LBS | OXYGEN SATURATION: 95 % | HEIGHT: 66 IN | BODY MASS INDEX: 24.73 KG/M2

## 2024-12-16 DIAGNOSIS — I10 PRIMARY HYPERTENSION: ICD-10-CM

## 2024-12-16 DIAGNOSIS — R79.89 ELEVATED TROPONIN: ICD-10-CM

## 2024-12-16 DIAGNOSIS — I51.89 DIASTOLIC DYSFUNCTION: ICD-10-CM

## 2024-12-16 DIAGNOSIS — M54.9 BACK PAIN, UNSPECIFIED BACK LOCATION, UNSPECIFIED BACK PAIN LATERALITY, UNSPECIFIED CHRONICITY: ICD-10-CM

## 2024-12-16 DIAGNOSIS — I50.32 CHRONIC HEART FAILURE WITH PRESERVED EJECTION FRACTION: Primary | ICD-10-CM

## 2024-12-16 DIAGNOSIS — I10 HYPERTENSION, UNSPECIFIED TYPE: ICD-10-CM

## 2024-12-16 DIAGNOSIS — D64.9 ANEMIA, UNSPECIFIED TYPE: ICD-10-CM

## 2024-12-16 DIAGNOSIS — E87.6 HYPOKALEMIA: ICD-10-CM

## 2024-12-16 DIAGNOSIS — R07.9 CHEST PAIN, UNSPECIFIED TYPE: ICD-10-CM

## 2024-12-16 PROCEDURE — 99999 PR PBB SHADOW E&M-EST. PATIENT-LVL III: CPT | Mod: PBBFAC,,, | Performed by: INTERNAL MEDICINE

## 2024-12-16 PROCEDURE — G2211 COMPLEX E/M VISIT ADD ON: HCPCS | Mod: S$PBB,,, | Performed by: INTERNAL MEDICINE

## 2024-12-16 PROCEDURE — 99214 OFFICE O/P EST MOD 30 MIN: CPT | Mod: S$PBB,,, | Performed by: INTERNAL MEDICINE

## 2024-12-16 PROCEDURE — 99213 OFFICE O/P EST LOW 20 MIN: CPT | Mod: PBBFAC,PO | Performed by: INTERNAL MEDICINE

## 2024-12-16 RX ORDER — FUROSEMIDE 40 MG/1
40 TABLET ORAL DAILY PRN
Qty: 90 TABLET | Refills: 3 | Status: SHIPPED | OUTPATIENT
Start: 2024-12-16 | End: 2025-12-16

## 2024-12-16 NOTE — PROGRESS NOTES
Subjective:   Patient ID:  Isabel Miles is a 79 y.o. female who presents for cardiac consult of No chief complaint on file.      Referral by: No referring provider defined for this encounter.     Reason for consult: HTN, elevated Trop      HPI  The patient came in today for cardiac consult of No chief complaint on file.      Isabel Miles is a 79 y.o. female pt with HTN, HFPEF, GERD, anemia, CKD presents for follow up CV Eval.        10/28/24  Told pt from last visit - results of labs reveal improved fluid level - BNP - due to less fluid, kidneys appear dry now - do not take any more lasix unless having extra swelling/weight gain of 2-3 lbs in 24 hrs; potassium is low - recommend taking potassium tablets next 3 days daily and then only with lasix. Monitor BP and weights daily and take lasix only if having more fluid/CHF symptoms.     BNP (pg/mL)   Date Value   10/28/2024 94   10/14/2024 137 (H)   10/14/2024 137 (H)   10/07/2024 249 (H)   09/17/2024 112 (H)     BNP improved to 137.    BP and HR stable. BMI 22 - 142 lbs   She had acute edema had to use lasix BID for a few days.   Drinks some cranberry juice and water.     12/16/24  Labs from last visit -  results of labs look good overall - the potassium, and magnesium are normal, kidney function appears mildly dry - can back off lasix next few days if you do not need it, the fluid level - BNP - is normal after several months. Can continue to take lasix and potassium as needed as discussed in clinic.     BP elevated this AM due to pain. Has improved breathing overall.   She has more edema no has not been taking lasix. She has gained about 11 lbs.     Results for orders placed during the hospital encounter of 07/05/24    Echo    Interpretation Summary    Left Ventricle: The left ventricle is normal in size. Normal wall thickness. There is normal systolic function with a visually estimated ejection fraction of 60 - 65%. Grade I diastolic dysfunction.    Right  Ventricle: Normal right ventricular cavity size. Wall thickness is normal. Systolic function is normal.    Tricuspid Valve: There is mild regurgitation.    Pulmonary Artery: The estimated pulmonary artery systolic pressure is 34 mmHg.    IVC/SVC: Normal venous pressure at 3 mmHg.      No results found for this or any previous visit.      No results found for this or any previous visit.      No cardiac monitor results found for the past 12 months         Past Medical History:   Diagnosis Date    Hypertension        Past Surgical History:   Procedure Laterality Date    CHOLECYSTECTOMY         Social History     Tobacco Use    Smoking status: Never     Passive exposure: Current (niece smokes outside)    Smokeless tobacco: Never   Substance Use Topics    Alcohol use: Never    Drug use: Never       No family history on file.    Patient's Medications   New Prescriptions    No medications on file   Previous Medications    FUROSEMIDE (LASIX) 40 MG TABLET    Take 1 tablet (40 mg total) by mouth daily as needed (edema, swelling, fluid build up with 3 pound weight gain in 24 hours).    LIDOCAINE (LIDODERM) 5 %    Place 1 patch onto the skin daily as needed. Do not leave patch on for > 12 hrs.    MAGNESIUM OXIDE (MAG-OX) 400 MG (241.3 MG MAGNESIUM) TABLET    Take 1 tablet (400 mg total) by mouth once daily.    OLMESARTAN (BENICAR) 40 MG TABLET    Take 1 tablet (40 mg total) by mouth every evening.    PANTOPRAZOLE (PROTONIX) 40 MG TABLET    Take 1 tablet (40 mg total) by mouth once daily.    POTASSIUM CHLORIDE (KLOR-CON) 8 MEQ TBSR    Take 1 tablet (8 mEq total) by mouth daily as needed (take with fluid pills). Take with fluid pills   Modified Medications    No medications on file   Discontinued Medications    No medications on file       Review of Systems   Constitutional: Negative.    HENT: Negative.     Eyes: Negative.    Respiratory: Negative.     Cardiovascular:  Positive for chest pain and leg swelling.   Gastrointestinal:  " Positive for heartburn.   Genitourinary: Negative.    Musculoskeletal:  Positive for back pain and joint pain.   Skin: Negative.    Neurological: Negative.    Endo/Heme/Allergies: Negative.    Psychiatric/Behavioral: Negative.     All 12 systems otherwise negative.      Wt Readings from Last 3 Encounters:   12/16/24 69.8 kg (153 lb 14.1 oz)   10/28/24 64.6 kg (142 lb 6.7 oz)   10/14/24 64.4 kg (141 lb 15.6 oz)     Temp Readings from Last 3 Encounters:   07/11/24 97 °F (36.1 °C) (Tympanic)   07/06/24 98.1 °F (36.7 °C) (Oral)   07/05/24 98 °F (36.7 °C) (Oral)     BP Readings from Last 3 Encounters:   12/16/24 (!) 168/74   10/28/24 (!) 140/60   10/14/24 (!) 160/62     Pulse Readings from Last 3 Encounters:   12/16/24 93   10/28/24 79   10/14/24 84       BP (!) 168/74 (BP Location: Right arm, Patient Position: Sitting)   Pulse 93   Ht 5' 6" (1.676 m)   Wt 69.8 kg (153 lb 14.1 oz)   SpO2 95%   BMI 24.84 kg/m²     Objective:   Physical Exam  Vitals and nursing note reviewed.   Constitutional:       General: She is not in acute distress.     Appearance: She is well-developed. She is not diaphoretic.   HENT:      Head: Normocephalic and atraumatic.      Nose: Nose normal.   Eyes:      General: No scleral icterus.     Conjunctiva/sclera: Conjunctivae normal.   Neck:      Thyroid: No thyromegaly.      Vascular: No JVD.   Cardiovascular:      Rate and Rhythm: Normal rate and regular rhythm.      Heart sounds: S1 normal and S2 normal. Murmur heard.      No friction rub. No gallop. No S3 or S4 sounds.   Pulmonary:      Effort: Pulmonary effort is normal. No respiratory distress.      Breath sounds: Normal breath sounds. No stridor. No wheezing or rales.   Chest:      Chest wall: No tenderness.   Abdominal:      General: Bowel sounds are normal. There is no distension.      Palpations: Abdomen is soft. There is no mass.      Tenderness: There is no abdominal tenderness. There is no rebound.   Genitourinary:     Comments: " Deferred  Musculoskeletal:         General: No tenderness or deformity. Normal range of motion.      Cervical back: Normal range of motion and neck supple.      Right lower leg: Edema present.      Left lower leg: Edema present.   Lymphadenopathy:      Cervical: No cervical adenopathy.   Skin:     General: Skin is warm and dry.      Coloration: Skin is not pale.      Findings: No erythema or rash.   Neurological:      Mental Status: She is alert and oriented to person, place, and time.      Motor: No abnormal muscle tone.      Coordination: Coordination normal.   Psychiatric:         Behavior: Behavior normal.         Thought Content: Thought content normal.         Judgment: Judgment normal.         Lab Results   Component Value Date     10/28/2024    K 4.5 10/28/2024    CL 96 10/28/2024    CO2 27 10/28/2024    BUN 32 (H) 10/28/2024    CREATININE 2.6 (H) 10/28/2024    GLU 98 10/28/2024    MG 2.4 10/28/2024    AST 27 10/14/2024    ALT 18 10/14/2024    ALBUMIN 3.4 (L) 10/14/2024    PROT 7.6 10/14/2024    BILITOT 0.3 10/14/2024    WBC 7.57 07/11/2024    HGB 11.5 (L) 07/11/2024    HCT 36.5 (L) 07/11/2024    MCV 96 07/11/2024     07/11/2024    TSH 3.440 07/02/2024    BNP 94 10/28/2024         BNP (pg/mL)   Date Value   10/28/2024 94   10/14/2024 137 (H)   10/14/2024 137 (H)   10/07/2024 249 (H)   09/17/2024 112 (H)          Assessment:      1. Chronic heart failure with preserved ejection fraction    2. Diastolic dysfunction    3. Elevated troponin    4. Back pain, unspecified back location, unspecified back pain laterality, unspecified chronicity    5. Hypertension, unspecified type    6. Chest pain, unspecified type    7. Anemia, unspecified type    8. Primary hypertension    9. Hypokalemia          Plan:     Chest pain, elevated trop  - ECHO 7/2023 with normal bi V function, grade 1 DD, mild MR, PASP 34 mmHg.   - likely sec to demand ischemia  - order pharm nuclear stress test, pt cannot walk on  treadmill due to back pain - does not want it now     2. HTN with grade 1 DD with LE edema; last  --> 249 --> 137 --> 94  - weight -  BMI 23 - 146 lbs  --> 141 lbs  BMI 22 - 142 lbs  -> 153 lbs   - titrate meds - DC Norvasc   - cont diuretics   - cont low salt diet  - order LE u/s venous  - could not tolerate due to pain, refer to pain management   - repeat labs, added Lasix 20mg BID --> increased to lasix 40mg BID PRN  - needs to take now  - will restart Benicar  - needs to avoid salt and soup and canned     3. GERD sx   - cont PPI and f/u GI    4. CKD, anemia, h/o Hyperkalemia - improving K  - cont to monitor   - restarted K    5. Back pain, leg pain  - referred to pain management       Visit today included increased complexity associated with the care of the episodic problem edema addressed and managing the longitudinal care of the patient due to the serious and/or complex managed problem(s) .      Thank you for allowing me to participate in this patient's care. Please do not hesitate to contact me with any questions or concerns. Consult note has been forwarded to the referral physician.

## 2024-12-16 NOTE — PATIENT INSTRUCTIONS
"Patient Education       Low Salt Diet   About this topic   Sodium is a type of mineral found in many foods. It may also be called "salt." Sodium helps balance fluids in your body. Too much sodium may be bad for your health. You may have to limit the amount of sodium in your food.  Salt is known as sodium chloride. It is measured in grams (g) or milligrams (mg). Salt or sodium in our diet comes from 3 main sources:  Some sodium is naturally found in food.  We may add salt to our food when we eat or cook.  Processed foods give us most of the sodium in our diet.         What will the results be?   This diet may help lower your blood pressure. It may also help reduce extra water in your body. This may help kidney, heart, or liver problems.  What changes to diet are needed?   You need to know how much sodium is in the food you eat. Read food labels with care. Choose foods that have 5% or less sodium in one serving. Remember, if you eat more than one serving, you will be getting more sodium. It may take a while for your sense of taste to get used to food with less sodium. Be patient with yourself. You may be surprised at how well you will do.  Try to aim for a diet that has 2,300 mg (2.3 g) or less sodium in it each day. The Food & Drug Administration (FDA) has set up guidelines for food labels. These will help you make healthy choices. Look for these terms on food packages:  Sodium-free: Less than 5 mg in each serving. These are safe to eat.  Very low sodium: 35 mg of sodium or less in each serving. These are safe to eat.  Low sodium: 140 mg of sodium or less in each serving. You need to eat these with care.  Reduced sodium: At least 25% less sodium than is most often found in each serving. These foods can still be high in sodium.  Light in sodium: 50% less sodium in each serving.  Unsalted, no added salt, and without added salt: No salt is added during processing, but the food may still have sodium. Read the food label " and check the sodium content before eating.  What foods are good to eat?   You can control the amount of sodium in foods you make at home. Fresh foods that you cook are most often lower in sodium.  Regular bread, unsalted crackers, dry cereal, cooked rice, pasta, quinoa, and corn tortillas.  Fresh, frozen, low sodium, or salt-free canned vegetables. Limit vegetable juice or tomato juice to 1/2 cup (120 mL) each day.  Fresh, frozen, canned, or dried fruit without salt added  Nonfat or low-fat milk and yogurt, low-sodium cottage cheese, and other cheeses low in sodium.  Fresh or frozen beef, veal, lamb, pork, poultry, fish, shellfish  Low sodium canned meats, frozen dinners with less than 600 mg sodium  Corn, safflower, sunflower, and soybean oils and unsalted nuts and seeds  Egg and egg substitutes without added sodium  Dried peas, beans, and low-sodium peanut butter  All plain oils and low-sodium salad dressing  Low-sodium broths, soups, soy sauce, condiments, and snack foods  Pepper, herbs, spices, vinegar, lemon or lime juice  Low-sodium carbonated drinks  What foods should be limited or avoided?   Foods that are prepackaged or canned are most often high in sodium. Foods to limit or avoid include:  Salted breads, rolls, crackers, biscuits, cornbread  Quick breads, self-rising flours, biscuit mixes, regular bread crumbs, instant hot cereals  Commercially prepared rice, pasta, or stuffing mixes; potatoes and vegetable mixes  Regular canned vegetables and juices, vegetables with sauce, and pickled vegetables  Frozen vegetables with seasonings and sauces  Processed fruits with salt or sodium  Malted and chocolate milk and buttermilk  Regular and processed cheese and spreads  Smoked, cured, salted, or canned meat, fish, or poultry such as esteban, sausages, sardines, chipped beef, hot dogs, cold cuts, and frozen breaded meats  Salted and canned peas, beans, and olives  Salted snack foods and nuts  Oils mixed with  high-sodium parts such as salad dressing  Meat tenderizers, seasoning salt, and most flavored vinegars  Ketchup, bouillon cubes, salt, sea salt, kosher salt, onion salt, garlic salt, and pink Himalayan salt  What can be done to prevent this health problem?   Check with your doctor before using salt substitutes. Also, check the labels when taking over-the-counter (OTC) drugs such as laxatives and antacids. These can have a high sodium content.  When do I need to call the doctor?   Call your doctor if you have questions about this diet. Talk to a dietitian. They can help you find hidden sources of sodium in the food you eat.  Helpful tips   Use the nutrition facts labels as a guide to look for foods lower in sodium.  Do not use salt when eating or cooking.  Select fresh fruits and vegetables for snacks.  If you are eating out, ask the  to cook your food without salt, or choose foods without sauces.  Season your food with herbs and spices.  Drain and rinse canned beans and vegetables that contain sodium.  Eat foods with potassium. Potassium helps counter the effects of sodium. This may help lower your blood pressure. Foods high in potassium include: Potatoes, tomatoes, bananas, oranges, cantaloupe, beans, and greens.  Where can I learn more?   American Heart Association  https://www.heart.org/en/healthy-living/healthy-eating/eat-smart/sodium/how-to-reduce-sodium   American Heart Association  https://www.heart.org/en/healthy-living/healthy-eating/eat-smart/nutrition-basics/food-packaging-claims   NHS  https://www.nhs.uk/live-well/eat-well/tips-for-a-lower-salt-diet/   UpToDate  http://www.[employer].com/contents/low-sodium-diet-beyond-the-basics   Last Reviewed Date   2021-10-11  Consumer Information Use and Disclaimer   This information is not specific medical advice and does not replace information you receive from your health care provider. This is only a brief summary of general information. It does NOT include  all information about conditions, illnesses, injuries, tests, procedures, treatments, therapies, discharge instructions or life-style choices that may apply to you. You must talk with your health care provider for complete information about your health and treatment options. This information should not be used to decide whether or not to accept your health care providers advice, instructions or recommendations. Only your health care provider has the knowledge and training to provide advice that is right for you.  Copyright   Copyright © 2021 [employer] and its affiliates and/or licensors. All rights reserved.

## 2024-12-30 ENCOUNTER — OFFICE VISIT (OUTPATIENT)
Dept: CARDIOLOGY | Facility: CLINIC | Age: 79
End: 2024-12-30
Payer: MEDICARE

## 2024-12-30 ENCOUNTER — LAB VISIT (OUTPATIENT)
Dept: LAB | Facility: HOSPITAL | Age: 79
End: 2024-12-30
Attending: INTERNAL MEDICINE
Payer: MEDICARE

## 2024-12-30 VITALS
HEART RATE: 86 BPM | HEIGHT: 66 IN | BODY MASS INDEX: 24.84 KG/M2 | DIASTOLIC BLOOD PRESSURE: 80 MMHG | SYSTOLIC BLOOD PRESSURE: 152 MMHG

## 2024-12-30 DIAGNOSIS — I10 HYPERTENSION, UNSPECIFIED TYPE: ICD-10-CM

## 2024-12-30 DIAGNOSIS — M54.9 BACK PAIN, UNSPECIFIED BACK LOCATION, UNSPECIFIED BACK PAIN LATERALITY, UNSPECIFIED CHRONICITY: ICD-10-CM

## 2024-12-30 DIAGNOSIS — D64.9 ANEMIA, UNSPECIFIED TYPE: ICD-10-CM

## 2024-12-30 DIAGNOSIS — I50.33 ACUTE ON CHRONIC DIASTOLIC CONGESTIVE HEART FAILURE: ICD-10-CM

## 2024-12-30 DIAGNOSIS — I51.89 DIASTOLIC DYSFUNCTION: ICD-10-CM

## 2024-12-30 DIAGNOSIS — I10 PRIMARY HYPERTENSION: ICD-10-CM

## 2024-12-30 DIAGNOSIS — R60.0 LOCALIZED EDEMA: ICD-10-CM

## 2024-12-30 DIAGNOSIS — R07.9 CHEST PAIN, UNSPECIFIED TYPE: ICD-10-CM

## 2024-12-30 DIAGNOSIS — E87.6 HYPOKALEMIA: ICD-10-CM

## 2024-12-30 DIAGNOSIS — I50.32 CHRONIC HEART FAILURE WITH PRESERVED EJECTION FRACTION: ICD-10-CM

## 2024-12-30 DIAGNOSIS — I50.32 CHRONIC HEART FAILURE WITH PRESERVED EJECTION FRACTION: Primary | ICD-10-CM

## 2024-12-30 DIAGNOSIS — R79.89 ELEVATED TROPONIN: ICD-10-CM

## 2024-12-30 PROCEDURE — 83735 ASSAY OF MAGNESIUM: CPT | Performed by: INTERNAL MEDICINE

## 2024-12-30 PROCEDURE — 36415 COLL VENOUS BLD VENIPUNCTURE: CPT | Mod: PO | Performed by: INTERNAL MEDICINE

## 2024-12-30 PROCEDURE — 99999 PR PBB SHADOW E&M-EST. PATIENT-LVL III: CPT | Mod: PBBFAC,,, | Performed by: INTERNAL MEDICINE

## 2024-12-30 PROCEDURE — G2211 COMPLEX E/M VISIT ADD ON: HCPCS | Mod: S$PBB,,, | Performed by: INTERNAL MEDICINE

## 2024-12-30 PROCEDURE — 80048 BASIC METABOLIC PNL TOTAL CA: CPT | Performed by: INTERNAL MEDICINE

## 2024-12-30 PROCEDURE — 99213 OFFICE O/P EST LOW 20 MIN: CPT | Mod: PBBFAC,PO | Performed by: INTERNAL MEDICINE

## 2024-12-30 PROCEDURE — 83880 ASSAY OF NATRIURETIC PEPTIDE: CPT | Performed by: INTERNAL MEDICINE

## 2024-12-30 PROCEDURE — 99214 OFFICE O/P EST MOD 30 MIN: CPT | Mod: S$PBB,,, | Performed by: INTERNAL MEDICINE

## 2024-12-30 NOTE — PROGRESS NOTES
Subjective:   Patient ID:  Isabel Miles is a 79 y.o. female who presents for cardiac consult of No chief complaint on file.      Referral by: No referring provider defined for this encounter.     Reason for consult: HTN, elevated Trop      HPI  The patient came in today for cardiac consult of No chief complaint on file.      Isabel Miles is a 79 y.o. female pt with HTN, HFPEF, GERD, anemia, CKD presents for follow up CV Eval.        10/28/24  Told pt from last visit - results of labs reveal improved fluid level - BNP - due to less fluid, kidneys appear dry now - do not take any more lasix unless having extra swelling/weight gain of 2-3 lbs in 24 hrs; potassium is low - recommend taking potassium tablets next 3 days daily and then only with lasix. Monitor BP and weights daily and take lasix only if having more fluid/CHF symptoms.     BNP (pg/mL)   Date Value   10/28/2024 94   10/14/2024 137 (H)   10/14/2024 137 (H)   10/07/2024 249 (H)   09/17/2024 112 (H)     BNP improved to 137.    BP and HR stable. BMI 22 - 142 lbs   She had acute edema had to use lasix BID for a few days.   Drinks some cranberry juice and water.     12/16/24  Labs from last visit -  results of labs look good overall - the potassium, and magnesium are normal, kidney function appears mildly dry - can back off lasix next few days if you do not need it, the fluid level - BNP - is normal after several months. Can continue to take lasix and potassium as needed as discussed in clinic.     BP elevated this AM due to pain. Has improved breathing overall.   She has more edema no has not been taking lasix. She has gained about 11 lbs.     12/30/24  Bp elevated 152/80  HR 80s. BMI 24     From pt -    results of labs look good overall - the potassium, and magnesium are normal, kidney function appears mildly dry - can back off lasix next few days if you do not need it, the fluid level - BNP - is normal after several months. Can continue to take lasix  and potassium as needed as discussed in clinic.     Results for orders placed during the hospital encounter of 07/05/24    Echo    Interpretation Summary    Left Ventricle: The left ventricle is normal in size. Normal wall thickness. There is normal systolic function with a visually estimated ejection fraction of 60 - 65%. Grade I diastolic dysfunction.    Right Ventricle: Normal right ventricular cavity size. Wall thickness is normal. Systolic function is normal.    Tricuspid Valve: There is mild regurgitation.    Pulmonary Artery: The estimated pulmonary artery systolic pressure is 34 mmHg.    IVC/SVC: Normal venous pressure at 3 mmHg.      No results found for this or any previous visit.      No results found for this or any previous visit.      No cardiac monitor results found for the past 12 months         Past Medical History:   Diagnosis Date    Hypertension        Past Surgical History:   Procedure Laterality Date    CHOLECYSTECTOMY         Social History     Tobacco Use    Smoking status: Never     Passive exposure: Current (niece smokes outside)    Smokeless tobacco: Never   Substance Use Topics    Alcohol use: Never    Drug use: Never       No family history on file.    Patient's Medications   New Prescriptions    No medications on file   Previous Medications    FUROSEMIDE (LASIX) 40 MG TABLET    Take 1 tablet (40 mg total) by mouth daily as needed (edema, swelling, fluid build up with 3 pound weight gain in 24 hours).    LIDOCAINE (LIDODERM) 5 %    Place 1 patch onto the skin daily as needed. Do not leave patch on for > 12 hrs.    MAGNESIUM OXIDE (MAG-OX) 400 MG (241.3 MG MAGNESIUM) TABLET    Take 1 tablet (400 mg total) by mouth once daily.    OLMESARTAN (BENICAR) 40 MG TABLET    Take 1 tablet (40 mg total) by mouth every evening.    PANTOPRAZOLE (PROTONIX) 40 MG TABLET    Take 1 tablet (40 mg total) by mouth once daily.    POTASSIUM CHLORIDE (KLOR-CON) 8 MEQ TBSR    Take 1 tablet (8 mEq total) by mouth  "daily as needed (take with fluid pills). Take with fluid pills   Modified Medications    No medications on file   Discontinued Medications    No medications on file       Review of Systems   Constitutional: Negative.    HENT: Negative.     Eyes: Negative.    Respiratory: Negative.     Cardiovascular:  Positive for chest pain and leg swelling.   Gastrointestinal:  Positive for heartburn.   Genitourinary: Negative.    Musculoskeletal:  Positive for back pain and joint pain.   Skin: Negative.    Neurological: Negative.    Endo/Heme/Allergies: Negative.    Psychiatric/Behavioral: Negative.     All 12 systems otherwise negative.      Wt Readings from Last 3 Encounters:   12/16/24 69.8 kg (153 lb 14.1 oz)   10/28/24 64.6 kg (142 lb 6.7 oz)   10/14/24 64.4 kg (141 lb 15.6 oz)     Temp Readings from Last 3 Encounters:   07/11/24 97 °F (36.1 °C) (Tympanic)   07/06/24 98.1 °F (36.7 °C) (Oral)   07/05/24 98 °F (36.7 °C) (Oral)     BP Readings from Last 3 Encounters:   12/30/24 (!) 152/80   12/16/24 (!) 168/74   10/28/24 (!) 140/60     Pulse Readings from Last 3 Encounters:   12/30/24 86   12/16/24 93   10/28/24 79       BP (!) 152/80 (BP Location: Left arm, Patient Position: Sitting)   Pulse 86   Ht 5' 6" (1.676 m)   BMI 24.84 kg/m²     Objective:   Physical Exam  Vitals and nursing note reviewed.   Constitutional:       General: She is not in acute distress.     Appearance: She is well-developed. She is not diaphoretic.   HENT:      Head: Normocephalic and atraumatic.      Nose: Nose normal.   Eyes:      General: No scleral icterus.     Conjunctiva/sclera: Conjunctivae normal.   Neck:      Thyroid: No thyromegaly.      Vascular: No JVD.   Cardiovascular:      Rate and Rhythm: Normal rate and regular rhythm.      Heart sounds: S1 normal and S2 normal. Murmur heard.      No friction rub. No gallop. No S3 or S4 sounds.   Pulmonary:      Effort: Pulmonary effort is normal. No respiratory distress.      Breath sounds: Normal " breath sounds. No stridor. No wheezing or rales.   Chest:      Chest wall: No tenderness.   Abdominal:      General: Bowel sounds are normal. There is no distension.      Palpations: Abdomen is soft. There is no mass.      Tenderness: There is no abdominal tenderness. There is no rebound.   Genitourinary:     Comments: Deferred  Musculoskeletal:         General: No tenderness or deformity. Normal range of motion.      Cervical back: Normal range of motion and neck supple.      Right lower leg: Edema present.      Left lower leg: Edema present.   Lymphadenopathy:      Cervical: No cervical adenopathy.   Skin:     General: Skin is warm and dry.      Coloration: Skin is not pale.      Findings: No erythema or rash.   Neurological:      Mental Status: She is alert and oriented to person, place, and time.      Motor: No abnormal muscle tone.      Coordination: Coordination normal.   Psychiatric:         Behavior: Behavior normal.         Thought Content: Thought content normal.         Judgment: Judgment normal.         Lab Results   Component Value Date     10/28/2024    K 4.5 10/28/2024    CL 96 10/28/2024    CO2 27 10/28/2024    BUN 32 (H) 10/28/2024    CREATININE 2.6 (H) 10/28/2024    GLU 98 10/28/2024    MG 2.4 10/28/2024    AST 27 10/14/2024    ALT 18 10/14/2024    ALBUMIN 3.4 (L) 10/14/2024    PROT 7.6 10/14/2024    BILITOT 0.3 10/14/2024    WBC 7.57 07/11/2024    HGB 11.5 (L) 07/11/2024    HCT 36.5 (L) 07/11/2024    MCV 96 07/11/2024     07/11/2024    TSH 3.440 07/02/2024    BNP 94 10/28/2024         BNP (pg/mL)   Date Value   10/28/2024 94   10/14/2024 137 (H)   10/14/2024 137 (H)   10/07/2024 249 (H)   09/17/2024 112 (H)          Assessment:      1. Chronic heart failure with preserved ejection fraction    2. Diastolic dysfunction    3. Elevated troponin    4. Back pain, unspecified back location, unspecified back pain laterality, unspecified chronicity    5. Hypertension, unspecified type    6.  Chest pain, unspecified type    7. Anemia, unspecified type    8. Primary hypertension    9. Hypokalemia    10. Acute on chronic diastolic congestive heart failure    11. Localized edema            Plan:     Chest pain, elevated trop  - ECHO 7/2023 with normal bi V function, grade 1 DD, mild MR, PASP 34 mmHg.   - likely sec to demand ischemia  - order pharm nuclear stress test, pt cannot walk on treadmill due to back pain - does not want it now     2. HTN with grade 1 DD with LE edema; last  --> 249 --> 137 --> 94  - weight -  BMI 23 - 146 lbs  --> 141 lbs  BMI 22 - 142 lbs  -> 153 lbs   - titrate meds - DC Norvasc   - cont diuretics   - cont low salt diet  - order LE u/s venous  - could not tolerate due to pain, refer to pain management   - repeat labs, added Lasix 20mg BID --> increased to lasix 40mg BID PRN   - will restart Benicar  - needs to avoid salt and soup and canned     3. GERD sx   - cont PPI and f/u GI    4. CKD, anemia, h/o Hyperkalemia - improving K  - cont to monitor   - restarted K    5. Back pain, leg pain  - referred to pain management       Visit today included increased complexity associated with the care of the episodic problem edema addressed and managing the longitudinal care of the patient due to the serious and/or complex managed problem(s) .      Thank you for allowing me to participate in this patient's care. Please do not hesitate to contact me with any questions or concerns. Consult note has been forwarded to the referral physician.

## 2024-12-31 ENCOUNTER — TELEPHONE (OUTPATIENT)
Dept: PAIN MEDICINE | Facility: CLINIC | Age: 79
End: 2024-12-31
Payer: MEDICARE

## 2024-12-31 LAB
ANION GAP SERPL CALC-SCNC: 14 MMOL/L (ref 8–16)
BNP SERPL-MCNC: 100 PG/ML (ref 0–99)
BUN SERPL-MCNC: 50 MG/DL (ref 8–23)
CALCIUM SERPL-MCNC: 9.9 MG/DL (ref 8.7–10.5)
CHLORIDE SERPL-SCNC: 103 MMOL/L (ref 95–110)
CO2 SERPL-SCNC: 20 MMOL/L (ref 23–29)
CREAT SERPL-MCNC: 2.7 MG/DL (ref 0.5–1.4)
EST. GFR  (NO RACE VARIABLE): 17.2 ML/MIN/1.73 M^2
GLUCOSE SERPL-MCNC: 119 MG/DL (ref 70–110)
MAGNESIUM SERPL-MCNC: 2.6 MG/DL (ref 1.6–2.6)
POTASSIUM SERPL-SCNC: 5.7 MMOL/L (ref 3.5–5.1)
SODIUM SERPL-SCNC: 137 MMOL/L (ref 136–145)

## 2025-01-02 ENCOUNTER — TELEPHONE (OUTPATIENT)
Dept: CARDIOLOGY | Facility: CLINIC | Age: 80
End: 2025-01-02
Payer: MEDICARE

## 2025-01-02 ENCOUNTER — TELEPHONE (OUTPATIENT)
Dept: PAIN MEDICINE | Facility: CLINIC | Age: 80
End: 2025-01-02
Payer: MEDICARE

## 2025-01-02 DIAGNOSIS — I50.32 CHRONIC HEART FAILURE WITH PRESERVED EJECTION FRACTION: Primary | ICD-10-CM

## 2025-01-02 NOTE — TELEPHONE ENCOUNTER
Spoke with niece to advise per Dr. Fried: results reveal elevated potassium as in the past - avoid taking potassium tablets for now, will repeat labs today/tomorrow. Can take lasix as needed, kidney function appears borderline dry, monitor pressures and weights home, follow up in 2-3 weeks or sooner if needed.  Labs scheduled for tomorrow.    ----- Message from Bala Fried MD sent at 1/2/2025  8:51 AM CST -----  Please contact the patient and let them know that their results reveal elevated potassium as in the past - avoid taking potassium tablets for now, will repeat labs today/tomorrow. Can take lasix as needed, kidney function appears borderline dry, monitor pressures and weights home, follow up in 2-3 weeks or sooner if needed. Thanks

## 2025-01-03 ENCOUNTER — LAB VISIT (OUTPATIENT)
Dept: LAB | Facility: HOSPITAL | Age: 80
End: 2025-01-03
Attending: INTERNAL MEDICINE
Payer: MEDICARE

## 2025-01-03 ENCOUNTER — TELEPHONE (OUTPATIENT)
Dept: PAIN MEDICINE | Facility: CLINIC | Age: 80
End: 2025-01-03
Payer: MEDICARE

## 2025-01-03 DIAGNOSIS — I50.32 CHRONIC HEART FAILURE WITH PRESERVED EJECTION FRACTION: ICD-10-CM

## 2025-01-03 LAB
ANION GAP SERPL CALC-SCNC: 8 MMOL/L (ref 8–16)
BUN SERPL-MCNC: 36 MG/DL (ref 8–23)
CALCIUM SERPL-MCNC: 9.7 MG/DL (ref 8.7–10.5)
CHLORIDE SERPL-SCNC: 105 MMOL/L (ref 95–110)
CO2 SERPL-SCNC: 20 MMOL/L (ref 23–29)
CREAT SERPL-MCNC: 1.9 MG/DL (ref 0.5–1.4)
EST. GFR  (NO RACE VARIABLE): 26.2 ML/MIN/1.73 M^2
GLUCOSE SERPL-MCNC: 108 MG/DL (ref 70–110)
POTASSIUM SERPL-SCNC: 5.6 MMOL/L (ref 3.5–5.1)
SODIUM SERPL-SCNC: 133 MMOL/L (ref 136–145)

## 2025-01-03 PROCEDURE — 80048 BASIC METABOLIC PNL TOTAL CA: CPT | Performed by: INTERNAL MEDICINE

## 2025-01-03 PROCEDURE — 36415 COLL VENOUS BLD VENIPUNCTURE: CPT | Mod: PO | Performed by: INTERNAL MEDICINE

## 2025-01-06 ENCOUNTER — TELEPHONE (OUTPATIENT)
Dept: CARDIOLOGY | Facility: CLINIC | Age: 80
End: 2025-01-06
Payer: MEDICARE

## 2025-01-06 NOTE — TELEPHONE ENCOUNTER
Attempted to reach the patient with lab results. LVM          Please contact the patient and let them know that their results of labs reveal improving kidney function and potassium, potassium is still elevated - need to avoid potassium tablets and any potassium rich foods, will repeat labs as needed at follow up.

## 2025-01-06 NOTE — TELEPHONE ENCOUNTER
----- Message from Bala Fried MD sent at 1/6/2025 12:19 PM CST -----  Please contact the patient and let them know that their results of labs reveal improving kidney function and potassium, potassium is still elevated - need to avoid potassium tablets and any potassium rich foods, will repeat labs as needed at follow up.

## 2025-01-10 DIAGNOSIS — I10 HYPERTENSION, UNSPECIFIED TYPE: Primary | ICD-10-CM

## 2025-01-13 ENCOUNTER — OFFICE VISIT (OUTPATIENT)
Dept: CARDIOLOGY | Facility: CLINIC | Age: 80
End: 2025-01-13
Payer: MEDICARE

## 2025-01-13 ENCOUNTER — HOSPITAL ENCOUNTER (OUTPATIENT)
Dept: CARDIOLOGY | Facility: HOSPITAL | Age: 80
Discharge: HOME | End: 2025-01-13
Attending: INTERNAL MEDICINE
Payer: MEDICARE

## 2025-01-13 VITALS
HEART RATE: 90 BPM | SYSTOLIC BLOOD PRESSURE: 140 MMHG | WEIGHT: 153.88 LBS | DIASTOLIC BLOOD PRESSURE: 80 MMHG | HEIGHT: 66 IN | BODY MASS INDEX: 24.73 KG/M2

## 2025-01-13 DIAGNOSIS — I50.32 CHRONIC HEART FAILURE WITH PRESERVED EJECTION FRACTION: ICD-10-CM

## 2025-01-13 DIAGNOSIS — I10 PRIMARY HYPERTENSION: ICD-10-CM

## 2025-01-13 DIAGNOSIS — I10 HYPERTENSION, UNSPECIFIED TYPE: ICD-10-CM

## 2025-01-13 DIAGNOSIS — D64.9 ANEMIA, UNSPECIFIED TYPE: ICD-10-CM

## 2025-01-13 DIAGNOSIS — I50.32 CHRONIC HEART FAILURE WITH PRESERVED EJECTION FRACTION: Primary | ICD-10-CM

## 2025-01-13 DIAGNOSIS — E87.5 HYPERKALEMIA: ICD-10-CM

## 2025-01-13 DIAGNOSIS — I50.33 ACUTE ON CHRONIC DIASTOLIC CONGESTIVE HEART FAILURE: ICD-10-CM

## 2025-01-13 DIAGNOSIS — E87.6 HYPOKALEMIA: ICD-10-CM

## 2025-01-13 DIAGNOSIS — M54.9 BACK PAIN, UNSPECIFIED BACK LOCATION, UNSPECIFIED BACK PAIN LATERALITY, UNSPECIFIED CHRONICITY: ICD-10-CM

## 2025-01-13 DIAGNOSIS — R07.9 CHEST PAIN, UNSPECIFIED TYPE: ICD-10-CM

## 2025-01-13 DIAGNOSIS — R79.89 ELEVATED TROPONIN: ICD-10-CM

## 2025-01-13 DIAGNOSIS — I51.89 DIASTOLIC DYSFUNCTION: ICD-10-CM

## 2025-01-13 PROCEDURE — 93010 ELECTROCARDIOGRAM REPORT: CPT | Mod: ,,, | Performed by: INTERNAL MEDICINE

## 2025-01-13 PROCEDURE — 99214 OFFICE O/P EST MOD 30 MIN: CPT | Mod: S$PBB,,, | Performed by: INTERNAL MEDICINE

## 2025-01-13 PROCEDURE — G2211 COMPLEX E/M VISIT ADD ON: HCPCS | Mod: S$PBB,,, | Performed by: INTERNAL MEDICINE

## 2025-01-13 PROCEDURE — 99999 PR PBB SHADOW E&M-EST. PATIENT-LVL III: CPT | Mod: PBBFAC,,, | Performed by: INTERNAL MEDICINE

## 2025-01-13 PROCEDURE — 99213 OFFICE O/P EST LOW 20 MIN: CPT | Mod: PBBFAC,PO | Performed by: INTERNAL MEDICINE

## 2025-01-13 PROCEDURE — 93005 ELECTROCARDIOGRAM TRACING: CPT | Mod: PO

## 2025-01-13 NOTE — PROGRESS NOTES
Subjective:   Patient ID:  Isabel Miles is a 79 y.o. female who presents for cardiac consult of No chief complaint on file.      Referral by: No referring provider defined for this encounter.     Reason for consult: HTN, elevated Trop      HPI  The patient came in today for cardiac consult of No chief complaint on file.      Isabel Miles is a 79 y.o. female pt with HTN, HFPEF, GERD, anemia, CKD presents for follow up CV Eval.        12/30/24  Bp elevated 152/80  HR 80s. BMI 24     From pt -    results of labs look good overall - the potassium, and magnesium are normal, kidney function appears mildly dry - can back off lasix next few days if you do not need it, the fluid level - BNP - is normal after several months. Can continue to take lasix and potassium as needed as discussed in clinic.     1/13/25  Told pt labs -   results reveal elevated potassium as in the past - avoid taking potassium tablets for now, will repeat labs today/tomorrow. Can take lasix as needed, kidney function appears borderline dry, monitor pressures and weights home, follow up in 2-3 weeks or sooner if needed.           Results for orders placed during the hospital encounter of 07/05/24    Echo    Interpretation Summary    Left Ventricle: The left ventricle is normal in size. Normal wall thickness. There is normal systolic function with a visually estimated ejection fraction of 60 - 65%. Grade I diastolic dysfunction.    Right Ventricle: Normal right ventricular cavity size. Wall thickness is normal. Systolic function is normal.    Tricuspid Valve: There is mild regurgitation.    Pulmonary Artery: The estimated pulmonary artery systolic pressure is 34 mmHg.    IVC/SVC: Normal venous pressure at 3 mmHg.      No results found for this or any previous visit.      No results found for this or any previous visit.      No cardiac monitor results found for the past 12 months         Past Medical History:   Diagnosis Date    Hypertension         Past Surgical History:   Procedure Laterality Date    CHOLECYSTECTOMY         Social History     Tobacco Use    Smoking status: Never     Passive exposure: Current (niece smokes outside)    Smokeless tobacco: Never   Substance Use Topics    Alcohol use: Never    Drug use: Never       No family history on file.    Patient's Medications   New Prescriptions    No medications on file   Previous Medications    FUROSEMIDE (LASIX) 40 MG TABLET    Take 1 tablet (40 mg total) by mouth daily as needed (edema, swelling, fluid build up with 3 pound weight gain in 24 hours).    LIDOCAINE (LIDODERM) 5 %    Place 1 patch onto the skin daily as needed. Do not leave patch on for > 12 hrs.    MAGNESIUM OXIDE (MAG-OX) 400 MG (241.3 MG MAGNESIUM) TABLET    Take 1 tablet (400 mg total) by mouth once daily.    OLMESARTAN (BENICAR) 40 MG TABLET    Take 1 tablet (40 mg total) by mouth every evening.    PANTOPRAZOLE (PROTONIX) 40 MG TABLET    Take 1 tablet (40 mg total) by mouth once daily.    POTASSIUM CHLORIDE (KLOR-CON) 8 MEQ TBSR    Take 1 tablet (8 mEq total) by mouth daily as needed (take with fluid pills). Take with fluid pills   Modified Medications    No medications on file   Discontinued Medications    No medications on file       Review of Systems   Constitutional: Negative.    HENT: Negative.     Eyes: Negative.    Respiratory: Negative.     Cardiovascular:  Positive for chest pain and leg swelling.   Gastrointestinal:  Positive for heartburn.   Genitourinary: Negative.    Musculoskeletal:  Positive for back pain and joint pain.   Skin: Negative.    Neurological: Negative.    Endo/Heme/Allergies: Negative.    Psychiatric/Behavioral: Negative.     All 12 systems otherwise negative.      Wt Readings from Last 3 Encounters:   12/16/24 69.8 kg (153 lb 14.1 oz)   10/28/24 64.6 kg (142 lb 6.7 oz)   10/14/24 64.4 kg (141 lb 15.6 oz)     Temp Readings from Last 3 Encounters:   07/11/24 97 °F (36.1 °C) (Tympanic)   07/06/24 98.1 °F  (36.7 °C) (Oral)   07/05/24 98 °F (36.7 °C) (Oral)     BP Readings from Last 3 Encounters:   12/30/24 (!) 152/80   12/16/24 (!) 168/74   10/28/24 (!) 140/60     Pulse Readings from Last 3 Encounters:   12/30/24 86   12/16/24 93   10/28/24 79       There were no vitals taken for this visit.    Objective:   Physical Exam  Vitals and nursing note reviewed.   Constitutional:       General: She is not in acute distress.     Appearance: She is well-developed. She is not diaphoretic.   HENT:      Head: Normocephalic and atraumatic.      Nose: Nose normal.   Eyes:      General: No scleral icterus.     Conjunctiva/sclera: Conjunctivae normal.   Neck:      Thyroid: No thyromegaly.      Vascular: No JVD.   Cardiovascular:      Rate and Rhythm: Normal rate and regular rhythm.      Heart sounds: S1 normal and S2 normal. Murmur heard.      No friction rub. No gallop. No S3 or S4 sounds.   Pulmonary:      Effort: Pulmonary effort is normal. No respiratory distress.      Breath sounds: Normal breath sounds. No stridor. No wheezing or rales.   Chest:      Chest wall: No tenderness.   Abdominal:      General: Bowel sounds are normal. There is no distension.      Palpations: Abdomen is soft. There is no mass.      Tenderness: There is no abdominal tenderness. There is no rebound.   Genitourinary:     Comments: Deferred  Musculoskeletal:         General: No tenderness or deformity. Normal range of motion.      Cervical back: Normal range of motion and neck supple.      Right lower leg: Edema present.      Left lower leg: Edema present.   Lymphadenopathy:      Cervical: No cervical adenopathy.   Skin:     General: Skin is warm and dry.      Coloration: Skin is not pale.      Findings: No erythema or rash.   Neurological:      Mental Status: She is alert and oriented to person, place, and time.      Motor: No abnormal muscle tone.      Coordination: Coordination normal.   Psychiatric:         Behavior: Behavior normal.         Thought  Content: Thought content normal.         Judgment: Judgment normal.         Lab Results   Component Value Date     (L) 01/03/2025    K 5.6 (H) 01/03/2025     01/03/2025    CO2 20 (L) 01/03/2025    BUN 36 (H) 01/03/2025    CREATININE 1.9 (H) 01/03/2025     01/03/2025    MG 2.6 12/30/2024    AST 27 10/14/2024    ALT 18 10/14/2024    ALBUMIN 3.4 (L) 10/14/2024    PROT 7.6 10/14/2024    BILITOT 0.3 10/14/2024    WBC 7.57 07/11/2024    HGB 11.5 (L) 07/11/2024    HCT 36.5 (L) 07/11/2024    MCV 96 07/11/2024     07/11/2024    TSH 3.440 07/02/2024     (H) 12/30/2024         BNP (pg/mL)   Date Value   12/30/2024 100 (H)   10/28/2024 94   10/14/2024 137 (H)   10/14/2024 137 (H)   10/07/2024 249 (H)          Assessment:      1. Chronic heart failure with preserved ejection fraction    2. Hypertension, unspecified type    3. Diastolic dysfunction    4. Elevated troponin    5. Back pain, unspecified back location, unspecified back pain laterality, unspecified chronicity    6. Chest pain, unspecified type    7. Anemia, unspecified type    8. Primary hypertension    9. Hypokalemia    10. Acute on chronic diastolic congestive heart failure            Plan:     Chest pain, elevated trop  - ECHO 7/2023 with normal bi V function, grade 1 DD, mild MR, PASP 34 mmHg.   - likely sec to demand ischemia  - order pharm nuclear stress test, pt cannot walk on treadmill due to back pain - does not want it now     2. HTN with grade 1 DD with LE edema; last  --> 249 --> 137 --> 94 --> 100  - weight -  BMI 23 - 146 lbs  --> 141 lbs  BMI 22 - 142 lbs  -> 153 lbs --> 153  - titrate meds - DC Norvasc   - cont diuretics   - cont low salt diet  - order LE u/s venous  - could not tolerate due to pain, refer to pain management   - repeat labs, added Lasix 20mg BID --> increased to lasix 40mg BID PRN   - restarted Benicar  may need to stop if K remains elevated, referred to nephro as well   - needs to avoid salt and  soup and canned     3. GERD sx   - cont PPI and f/u GI    4. CKD, anemia, h/o Hyperkalemia - improving K  - cont to monitor   - restarted K    5. Back pain, leg pain  - referred to pain management  - cancel       Visit today included increased complexity associated with the care of the episodic problem edema addressed and managing the longitudinal care of the patient due to the serious and/or complex managed problem(s) .      Thank you for allowing me to participate in this patient's care. Please do not hesitate to contact me with any questions or concerns. Consult note has been forwarded to the referral physician.

## 2025-01-14 DIAGNOSIS — I50.32 CHRONIC HEART FAILURE WITH PRESERVED EJECTION FRACTION: Primary | ICD-10-CM

## 2025-01-15 ENCOUNTER — TELEPHONE (OUTPATIENT)
Dept: CARDIOLOGY | Facility: CLINIC | Age: 80
End: 2025-01-15
Payer: MEDICARE

## 2025-01-15 NOTE — TELEPHONE ENCOUNTER
Spoke with patient's niece Autumn to advise that per Dr. Fried: labs results reveal worsening potassium at 6.1 with elevated BNP due to more fluid - I am stopping Benicar; need to take Lasix 80mg (two tabs) for the next 3 days and repeat labs on Friday morning). Need to see nephrologist asap or go to the ER if potassium remains elevated. Labs scheduled for Friday 1- at 10am in [place].     ----- Message from Bala Fried MD sent at 1/14/2025  5:56 PM CST -----  Please contact the patient and let them know that their results reveal worsening potassium at 6.1 with elevated BNP due to more fluid - I am stopping Benicar; need to take Lasix 80mg (two tabs) for the next 3 days and repeat labs on Friday morning). Need to see nephrologist asap or go to the ER if potassium remains elevated.

## 2025-01-16 DIAGNOSIS — I50.32 CHRONIC HEART FAILURE WITH PRESERVED EJECTION FRACTION: Primary | ICD-10-CM

## 2025-01-16 DIAGNOSIS — N28.9 RENAL INSUFFICIENCY: Primary | ICD-10-CM

## 2025-01-16 LAB
OHS QRS DURATION: 68 MS
OHS QTC CALCULATION: 401 MS

## 2025-01-17 ENCOUNTER — LAB VISIT (OUTPATIENT)
Dept: LAB | Facility: HOSPITAL | Age: 80
End: 2025-01-17
Attending: INTERNAL MEDICINE
Payer: MEDICARE

## 2025-01-17 DIAGNOSIS — I50.32 CHRONIC HEART FAILURE WITH PRESERVED EJECTION FRACTION: ICD-10-CM

## 2025-01-17 LAB
ANION GAP SERPL CALC-SCNC: 11 MMOL/L (ref 8–16)
BNP SERPL-MCNC: 52 PG/ML (ref 0–99)
BUN SERPL-MCNC: 49 MG/DL (ref 8–23)
CALCIUM SERPL-MCNC: 9.3 MG/DL (ref 8.7–10.5)
CHLORIDE SERPL-SCNC: 102 MMOL/L (ref 95–110)
CO2 SERPL-SCNC: 20 MMOL/L (ref 23–29)
CREAT SERPL-MCNC: 2.6 MG/DL (ref 0.5–1.4)
EST. GFR  (NO RACE VARIABLE): 18 ML/MIN/1.73 M^2
GLUCOSE SERPL-MCNC: 105 MG/DL (ref 70–110)
MAGNESIUM SERPL-MCNC: 2 MG/DL (ref 1.6–2.6)
POTASSIUM SERPL-SCNC: 5.5 MMOL/L (ref 3.5–5.1)
SODIUM SERPL-SCNC: 133 MMOL/L (ref 136–145)

## 2025-01-17 PROCEDURE — 36415 COLL VENOUS BLD VENIPUNCTURE: CPT | Mod: PO | Performed by: INTERNAL MEDICINE

## 2025-01-17 PROCEDURE — 83735 ASSAY OF MAGNESIUM: CPT | Performed by: INTERNAL MEDICINE

## 2025-01-17 PROCEDURE — 83880 ASSAY OF NATRIURETIC PEPTIDE: CPT | Performed by: INTERNAL MEDICINE

## 2025-01-17 PROCEDURE — 80048 BASIC METABOLIC PNL TOTAL CA: CPT | Performed by: INTERNAL MEDICINE

## 2025-01-24 ENCOUNTER — TELEPHONE (OUTPATIENT)
Dept: CARDIOLOGY | Facility: CLINIC | Age: 80
End: 2025-01-24
Payer: MEDICARE

## 2025-01-24 NOTE — TELEPHONE ENCOUNTER
----- Message from Baal Fried MD sent at 1/21/2025  8:54 AM CST -----  Please contact the patient and let them know that their results of labs reveal mild improvement in potassium, kidney function is worse - need to increase fluids now and do not take any diuretics unless having worsening swelling/weight gain/edema etc and then take only for a few days. The BNP - fluid level is low/normal.

## 2025-01-24 NOTE — TELEPHONE ENCOUNTER
The patient's niece has been notified of the results and medication instructions were given.          Please contact the patient and let them know that their results of labs reveal mild improvement in potassium, kidney function is worse - need to increase fluids now and do not take any diuretics unless having worsening swelling/weight gain/edema etc and then take only for a few days. The BNP - fluid level is low/normal.

## 2025-01-28 ENCOUNTER — PATIENT MESSAGE (OUTPATIENT)
Dept: CARDIOLOGY | Facility: CLINIC | Age: 80
End: 2025-01-28
Payer: MEDICARE

## 2025-01-28 DIAGNOSIS — R09.89 PULSE IRREGULARITY: ICD-10-CM

## 2025-01-28 DIAGNOSIS — R00.2 PALPITATIONS: Primary | ICD-10-CM

## 2025-01-29 ENCOUNTER — PATIENT MESSAGE (OUTPATIENT)
Dept: CARDIOLOGY | Facility: HOSPITAL | Age: 80
End: 2025-01-29
Payer: MEDICARE

## 2025-03-04 ENCOUNTER — TELEPHONE (OUTPATIENT)
Dept: NEPHROLOGY | Facility: CLINIC | Age: 80
End: 2025-03-04
Payer: MEDICARE

## 2025-03-05 ENCOUNTER — TELEPHONE (OUTPATIENT)
Dept: NEPHROLOGY | Facility: CLINIC | Age: 80
End: 2025-03-05
Payer: MEDICARE

## 2025-03-06 ENCOUNTER — TELEPHONE (OUTPATIENT)
Dept: NEPHROLOGY | Facility: CLINIC | Age: 80
End: 2025-03-06
Payer: MEDICARE

## 2025-03-06 ENCOUNTER — PATIENT MESSAGE (OUTPATIENT)
Dept: NEPHROLOGY | Facility: CLINIC | Age: 80
End: 2025-03-06
Payer: MEDICARE

## 2025-03-07 PROBLEM — N18.4 CHRONIC KIDNEY DISEASE (CKD), STAGE IV (SEVERE): Status: ACTIVE | Noted: 2025-03-07

## 2025-03-07 PROBLEM — N18.30 STAGE 3 CHRONIC KIDNEY DISEASE, UNSPECIFIED WHETHER STAGE 3A OR 3B CKD: Status: ACTIVE | Noted: 2025-03-07

## 2025-03-11 ENCOUNTER — PATIENT MESSAGE (OUTPATIENT)
Dept: CARDIOLOGY | Facility: HOSPITAL | Age: 80
End: 2025-03-11
Payer: MEDICARE